# Patient Record
Sex: MALE | Race: BLACK OR AFRICAN AMERICAN | NOT HISPANIC OR LATINO | URBAN - METROPOLITAN AREA
[De-identification: names, ages, dates, MRNs, and addresses within clinical notes are randomized per-mention and may not be internally consistent; named-entity substitution may affect disease eponyms.]

---

## 2017-05-15 ENCOUNTER — EMERGENCY (EMERGENCY)
Facility: HOSPITAL | Age: 62
LOS: 1 days | Discharge: HOME | End: 2017-05-15
Attending: EMERGENCY MEDICINE | Admitting: EMERGENCY MEDICINE
Payer: COMMERCIAL

## 2017-05-15 VITALS
SYSTOLIC BLOOD PRESSURE: 142 MMHG | RESPIRATION RATE: 16 BRPM | DIASTOLIC BLOOD PRESSURE: 89 MMHG | OXYGEN SATURATION: 100 % | TEMPERATURE: 98 F | HEART RATE: 78 BPM

## 2017-05-15 VITALS
DIASTOLIC BLOOD PRESSURE: 89 MMHG | OXYGEN SATURATION: 100 % | HEART RATE: 60 BPM | RESPIRATION RATE: 20 BRPM | TEMPERATURE: 98 F | SYSTOLIC BLOOD PRESSURE: 134 MMHG

## 2017-05-15 LAB
ALBUMIN SERPL ELPH-MCNC: 3.9 G/DL — SIGNIFICANT CHANGE UP (ref 3.3–5)
ALP SERPL-CCNC: 93 U/L — SIGNIFICANT CHANGE UP (ref 40–120)
ALT FLD-CCNC: 15 U/L — SIGNIFICANT CHANGE UP (ref 4–41)
AST SERPL-CCNC: 19 U/L — SIGNIFICANT CHANGE UP (ref 4–40)
BASOPHILS # BLD AUTO: 0.04 K/UL — SIGNIFICANT CHANGE UP (ref 0–0.2)
BASOPHILS NFR BLD AUTO: 0.8 % — SIGNIFICANT CHANGE UP (ref 0–2)
BILIRUB SERPL-MCNC: 0.7 MG/DL — SIGNIFICANT CHANGE UP (ref 0.2–1.2)
BUN SERPL-MCNC: 12 MG/DL — SIGNIFICANT CHANGE UP (ref 7–23)
CALCIUM SERPL-MCNC: 9.2 MG/DL — SIGNIFICANT CHANGE UP (ref 8.4–10.5)
CHLORIDE SERPL-SCNC: 104 MMOL/L — SIGNIFICANT CHANGE UP (ref 98–107)
CK MB BLD-MCNC: 2.87 NG/ML — SIGNIFICANT CHANGE UP (ref 1–6.6)
CK MB BLD-MCNC: 2.97 NG/ML — SIGNIFICANT CHANGE UP (ref 1–6.6)
CK SERPL-CCNC: 185 U/L — SIGNIFICANT CHANGE UP (ref 30–200)
CK SERPL-CCNC: 187 U/L — SIGNIFICANT CHANGE UP (ref 30–200)
CO2 SERPL-SCNC: 24 MMOL/L — SIGNIFICANT CHANGE UP (ref 22–31)
CREAT SERPL-MCNC: 1.01 MG/DL — SIGNIFICANT CHANGE UP (ref 0.5–1.3)
EOSINOPHIL # BLD AUTO: 0.21 K/UL — SIGNIFICANT CHANGE UP (ref 0–0.5)
EOSINOPHIL NFR BLD AUTO: 4 % — SIGNIFICANT CHANGE UP (ref 0–6)
GLUCOSE SERPL-MCNC: 101 MG/DL — HIGH (ref 70–99)
HCT VFR BLD CALC: 37.9 % — LOW (ref 39–50)
HGB BLD-MCNC: 12.4 G/DL — LOW (ref 13–17)
IMM GRANULOCYTES NFR BLD AUTO: 0.2 % — SIGNIFICANT CHANGE UP (ref 0–1.5)
LYMPHOCYTES # BLD AUTO: 1.41 K/UL — SIGNIFICANT CHANGE UP (ref 1–3.3)
LYMPHOCYTES # BLD AUTO: 27 % — SIGNIFICANT CHANGE UP (ref 13–44)
MCHC RBC-ENTMCNC: 29 PG — SIGNIFICANT CHANGE UP (ref 27–34)
MCHC RBC-ENTMCNC: 32.7 % — SIGNIFICANT CHANGE UP (ref 32–36)
MCV RBC AUTO: 88.6 FL — SIGNIFICANT CHANGE UP (ref 80–100)
MONOCYTES # BLD AUTO: 0.47 K/UL — SIGNIFICANT CHANGE UP (ref 0–0.9)
MONOCYTES NFR BLD AUTO: 9 % — SIGNIFICANT CHANGE UP (ref 2–14)
NEUTROPHILS # BLD AUTO: 3.09 K/UL — SIGNIFICANT CHANGE UP (ref 1.8–7.4)
NEUTROPHILS NFR BLD AUTO: 59 % — SIGNIFICANT CHANGE UP (ref 43–77)
PLATELET # BLD AUTO: 233 K/UL — SIGNIFICANT CHANGE UP (ref 150–400)
PMV BLD: 10.4 FL — SIGNIFICANT CHANGE UP (ref 7–13)
POTASSIUM SERPL-MCNC: 4.3 MMOL/L — SIGNIFICANT CHANGE UP (ref 3.5–5.3)
POTASSIUM SERPL-SCNC: 4.3 MMOL/L — SIGNIFICANT CHANGE UP (ref 3.5–5.3)
PROT SERPL-MCNC: 6.5 G/DL — SIGNIFICANT CHANGE UP (ref 6–8.3)
RBC # BLD: 4.28 M/UL — SIGNIFICANT CHANGE UP (ref 4.2–5.8)
RBC # FLD: 13.4 % — SIGNIFICANT CHANGE UP (ref 10.3–14.5)
SODIUM SERPL-SCNC: 142 MMOL/L — SIGNIFICANT CHANGE UP (ref 135–145)
TROPONIN T SERPL-MCNC: < 0.06 NG/ML — SIGNIFICANT CHANGE UP (ref 0–0.06)
TROPONIN T SERPL-MCNC: < 0.06 NG/ML — SIGNIFICANT CHANGE UP (ref 0–0.06)
WBC # BLD: 5.23 K/UL — SIGNIFICANT CHANGE UP (ref 3.8–10.5)
WBC # FLD AUTO: 5.23 K/UL — SIGNIFICANT CHANGE UP (ref 3.8–10.5)

## 2017-05-15 PROCEDURE — 71020: CPT | Mod: 26

## 2017-05-15 PROCEDURE — 93010 ELECTROCARDIOGRAM REPORT: CPT | Mod: 76

## 2017-05-15 PROCEDURE — 99285 EMERGENCY DEPT VISIT HI MDM: CPT | Mod: 25

## 2017-05-15 RX ORDER — KETOROLAC TROMETHAMINE 30 MG/ML
30 SYRINGE (ML) INJECTION ONCE
Refills: 0 | Status: DISCONTINUED | OUTPATIENT
Start: 2017-05-15 | End: 2017-05-15

## 2017-05-15 RX ORDER — ASPIRIN/CALCIUM CARB/MAGNESIUM 324 MG
162 TABLET ORAL ONCE
Refills: 0 | Status: COMPLETED | OUTPATIENT
Start: 2017-05-15 | End: 2017-05-15

## 2017-05-15 RX ADMIN — Medication 162 MILLIGRAM(S): at 06:51

## 2017-05-15 RX ADMIN — Medication 30 MILLIGRAM(S): at 05:30

## 2017-05-15 RX ADMIN — Medication 30 MILLIGRAM(S): at 06:00

## 2017-05-15 NOTE — ED PROVIDER NOTE - OBJECTIVE STATEMENT
60yo male h/o GIST tumor on Gleevec, HTn (norvasc) p/w 3 days right sided chest pain, worsening. Pain is worse with certain movements, radiates to right shoulder when extending his arm. No associated SOB, nausea, diaphoresis, numbness or tingling. 62yo male h/o GIST tumor on Gleevec, HTn (norvasc) p/w 3 days right sided chest pain, worsening. Pain is worse with certain movements, radiates to right shoulder when extending his arm. No associated SOB, nausea, diaphoresis, numbness or tingling. Never had similar pain before. Pt is , lifts about 20lb items at a time. Pain improved after taking motrin. Pt had b/l calf pain 2 weeks and was found to have elevated CPK 2/2 gleevec which was stopped for 2 weeks, CK returned to normal and gleevec was restarted

## 2017-05-15 NOTE — ED ADULT NURSE NOTE - OBJECTIVE STATEMENT
Patient received in room #24 c/o right chest pain radiating to right shoulder starting friday.. Patient A&OX3, ambulatory. Patient reports increase pain when he bends down. Patient denies any headache, N/V/D. Denies any sob. 20G IV Placed in left ac, labs drawn and sent. Will monitor.

## 2017-05-15 NOTE — ED ADULT TRIAGE NOTE - CHIEF COMPLAINT QUOTE
right sided chest pressure since Friday on and off. pt says  now it is more and he feels like he can't breathe. denies nausea or vomiting or any other associated symptoms. PMH OF HTN

## 2017-05-15 NOTE — ED PROVIDER NOTE - MEDICAL DECISION MAKING DETAILS
60yo male htn with right sided chest pain, reproducible, no SOB, non exertional, atypical chest pain, cex2, ekg, xr, pain control, reassess, likely msk

## 2017-05-15 NOTE — ED PROVIDER NOTE - MUSCULOSKELETAL, MLM
Spine appears normal, range of motion is not limited, chest pain reproducible with extension of right arm

## 2018-02-19 ENCOUNTER — OUTPATIENT (OUTPATIENT)
Dept: OUTPATIENT SERVICES | Facility: HOSPITAL | Age: 63
LOS: 1 days | End: 2018-02-19
Payer: COMMERCIAL

## 2018-02-19 PROCEDURE — 73502 X-RAY EXAM HIP UNI 2-3 VIEWS: CPT | Mod: 26,LT

## 2018-02-19 PROCEDURE — 73502 X-RAY EXAM HIP UNI 2-3 VIEWS: CPT

## 2018-02-20 ENCOUNTER — OUTPATIENT (OUTPATIENT)
Dept: OUTPATIENT SERVICES | Facility: HOSPITAL | Age: 63
LOS: 1 days | End: 2018-02-20
Payer: COMMERCIAL

## 2018-02-20 DIAGNOSIS — Z22.321 CARRIER OR SUSPECTED CARRIER OF METHICILLIN SUSCEPTIBLE STAPHYLOCOCCUS AUREUS: ICD-10-CM

## 2018-02-20 LAB
MRSA PCR RESULT.: NEGATIVE — SIGNIFICANT CHANGE UP
S AUREUS DNA NOSE QL NAA+PROBE: POSITIVE

## 2018-02-20 PROCEDURE — 87641 MR-STAPH DNA AMP PROBE: CPT

## 2018-02-21 RX ORDER — MUPIROCIN 20 MG/G
1 OINTMENT TOPICAL
Qty: 1 | Refills: 0
Start: 2018-02-21 | End: 2018-02-25

## 2018-02-27 VITALS
HEART RATE: 59 BPM | HEIGHT: 72 IN | TEMPERATURE: 97 F | OXYGEN SATURATION: 100 % | RESPIRATION RATE: 16 BRPM | DIASTOLIC BLOOD PRESSURE: 84 MMHG | WEIGHT: 175.05 LBS | SYSTOLIC BLOOD PRESSURE: 126 MMHG

## 2018-02-27 NOTE — PATIENT PROFILE ADULT. - PMH
Burning feet syndrome    GIST (gastrointestinal stromal tumor), non-malignant    HTN (hypertension)    Leg pain, left    Osteoarthritis of hip Burning feet syndrome    GIST (gastrointestinal stromal tumor), non-malignant    Glaucoma    HTN (hypertension)    Leg pain, left    Osteoarthritis of hip

## 2018-02-27 NOTE — H&P ADULT - PROBLEM SELECTOR PLAN 1
Admit to Orthopaedic Service.  Presents today for elective left total hip replacement  Pt medically stable and cleared for procedure today by Dr. Venkat Lopez Admit to Orthopaedic Service.  Presents today for elective left total hip replacement  Pt cleared for procedure today by Dr. Venkat Lopez

## 2018-02-27 NOTE — PATIENT PROFILE ADULT. - TEACHING/LEARNING LEARNING PREFERENCES
written material/verbal instruction written material/individual instruction/skill demonstration/verbal instruction

## 2018-02-27 NOTE — H&P ADULT - HISTORY OF PRESENT ILLNESS
62M c/o left hip pain x    Presents today for elective left total hip replacement 62M  c/o left hip pain worsened over time without improvement. Denies numbness, tingling. Ambulates without assist. Presents today for elective Left THR.

## 2018-02-27 NOTE — H&P ADULT - NSHPLABSRESULTS_GEN_ALL_CORE
Preop CBC, CMP, PT/PTT/INR, UA - WNL per medical clearance   Preop EKG - sinus rhythm, incomplete RBBB - reviewed by clearance  Preop CXR - no consolidation  Nasal swab +MSSA Preop CBC, CMP, PT/PTT/INR, UA - WNL per medical clearance   Preop EKG - sinus rhythm, incomplete RBBB - reviewed by clearance  Preop CXR - no consolidation  Nasal swab +MSSA, completed treatment

## 2018-02-27 NOTE — H&P ADULT - NSHPPHYSICALEXAM_GEN_ALL_CORE
Head normal, atraumatic. Skin warm, dry and intact without erythema/lesions/rashes  MSK: +decreased ROM secondary to pain, left hip    Remainder of physical exam per medical clearance note Left LE: Decreased ROM secondary to pain, sensation intact, DP 2+, brisk cap refill, EHL/FHL/TA/GS 5/5  Rest of PE per MD clearance

## 2018-02-28 ENCOUNTER — INPATIENT (INPATIENT)
Facility: HOSPITAL | Age: 63
LOS: 1 days | Discharge: HOME HEALTH | DRG: 470 | End: 2018-03-02
Payer: COMMERCIAL

## 2018-02-28 DIAGNOSIS — M16.12 UNILATERAL PRIMARY OSTEOARTHRITIS, LEFT HIP: ICD-10-CM

## 2018-02-28 DIAGNOSIS — I95.9 HYPOTENSION, UNSPECIFIED: ICD-10-CM

## 2018-02-28 DIAGNOSIS — Z87.891 PERSONAL HISTORY OF NICOTINE DEPENDENCE: ICD-10-CM

## 2018-02-28 DIAGNOSIS — Z79.899 OTHER LONG TERM (CURRENT) DRUG THERAPY: ICD-10-CM

## 2018-02-28 DIAGNOSIS — I10 ESSENTIAL (PRIMARY) HYPERTENSION: ICD-10-CM

## 2018-02-28 DIAGNOSIS — D62 ACUTE POSTHEMORRHAGIC ANEMIA: ICD-10-CM

## 2018-02-28 DIAGNOSIS — M16.9 OSTEOARTHRITIS OF HIP, UNSPECIFIED: ICD-10-CM

## 2018-02-28 DIAGNOSIS — E53.9 VITAMIN B DEFICIENCY, UNSPECIFIED: ICD-10-CM

## 2018-02-28 DIAGNOSIS — Z86.018 PERSONAL HISTORY OF OTHER BENIGN NEOPLASM: ICD-10-CM

## 2018-02-28 DIAGNOSIS — C92.90 MYELOID LEUKEMIA, UNSPECIFIED, NOT HAVING ACHIEVED REMISSION: ICD-10-CM

## 2018-02-28 DIAGNOSIS — E53.8 DEFICIENCY OF OTHER SPECIFIED B GROUP VITAMINS: ICD-10-CM

## 2018-02-28 DIAGNOSIS — H40.10X0 UNSPECIFIED OPEN-ANGLE GLAUCOMA, STAGE UNSPECIFIED: ICD-10-CM

## 2018-02-28 DIAGNOSIS — Z98.890 OTHER SPECIFIED POSTPROCEDURAL STATES: Chronic | ICD-10-CM

## 2018-02-28 DIAGNOSIS — D13.1 BENIGN NEOPLASM OF STOMACH: Chronic | ICD-10-CM

## 2018-02-28 DIAGNOSIS — Z81.8 FAMILY HISTORY OF OTHER MENTAL AND BEHAVIORAL DISORDERS: ICD-10-CM

## 2018-02-28 DIAGNOSIS — Z28.09 IMMUNIZATION NOT CARRIED OUT BECAUSE OF OTHER CONTRAINDICATION: ICD-10-CM

## 2018-02-28 DIAGNOSIS — D21.4 BENIGN NEOPLASM OF CONNECTIVE AND OTHER SOFT TISSUE OF ABDOMEN: ICD-10-CM

## 2018-02-28 LAB
HCT VFR BLD CALC: 34.5 % — LOW (ref 39–50)
HGB BLD-MCNC: 11.3 G/DL — LOW (ref 13–17)
MCHC RBC-ENTMCNC: 29.2 PG — SIGNIFICANT CHANGE UP (ref 27–34)
MCHC RBC-ENTMCNC: 32.8 G/DL — SIGNIFICANT CHANGE UP (ref 32–36)
MCV RBC AUTO: 89.1 FL — SIGNIFICANT CHANGE UP (ref 80–100)
PLATELET # BLD AUTO: 210 K/UL — SIGNIFICANT CHANGE UP (ref 150–400)
RBC # BLD: 3.87 M/UL — LOW (ref 4.2–5.8)
RBC # FLD: 13.3 % — SIGNIFICANT CHANGE UP (ref 10.3–16.9)
WBC # BLD: 5.9 K/UL — SIGNIFICANT CHANGE UP (ref 3.8–10.5)
WBC # FLD AUTO: 5.9 K/UL — SIGNIFICANT CHANGE UP (ref 3.8–10.5)

## 2018-02-28 PROCEDURE — 72170 X-RAY EXAM OF PELVIS: CPT | Mod: 26

## 2018-02-28 RX ORDER — POLYETHYLENE GLYCOL 3350 17 G/17G
17 POWDER, FOR SOLUTION ORAL DAILY
Refills: 0 | Status: DISCONTINUED | OUTPATIENT
Start: 2018-02-28 | End: 2018-03-02

## 2018-02-28 RX ORDER — MORPHINE SULFATE 50 MG/1
4 CAPSULE, EXTENDED RELEASE ORAL ONCE
Refills: 0 | Status: DISCONTINUED | OUTPATIENT
Start: 2018-02-28 | End: 2018-02-28

## 2018-02-28 RX ORDER — ONDANSETRON 8 MG/1
4 TABLET, FILM COATED ORAL EVERY 6 HOURS
Refills: 0 | Status: DISCONTINUED | OUTPATIENT
Start: 2018-02-28 | End: 2018-03-02

## 2018-02-28 RX ORDER — BUPIVACAINE 13.3 MG/ML
20 INJECTION, SUSPENSION, LIPOSOMAL INFILTRATION ONCE
Refills: 0 | Status: DISCONTINUED | OUTPATIENT
Start: 2018-02-28 | End: 2018-03-02

## 2018-02-28 RX ORDER — HETASTARCH 6 G/100ML
500 INJECTION, SOLUTION INTRAVENOUS ONCE
Refills: 0 | Status: DISCONTINUED | OUTPATIENT
Start: 2018-02-28 | End: 2018-03-01

## 2018-02-28 RX ORDER — SODIUM CHLORIDE 9 MG/ML
1000 INJECTION, SOLUTION INTRAVENOUS
Refills: 0 | Status: DISCONTINUED | OUTPATIENT
Start: 2018-02-28 | End: 2018-03-01

## 2018-02-28 RX ORDER — IMATINIB MESYLATE 400 MG/1
400 TABLET, FILM COATED ORAL DAILY
Refills: 0 | Status: DISCONTINUED | OUTPATIENT
Start: 2018-02-28 | End: 2018-03-01

## 2018-02-28 RX ORDER — OXYCODONE HYDROCHLORIDE 5 MG/1
10 TABLET ORAL EVERY 12 HOURS
Refills: 0 | Status: DISCONTINUED | OUTPATIENT
Start: 2018-02-28 | End: 2018-03-01

## 2018-02-28 RX ORDER — CEFAZOLIN SODIUM 1 G
2000 VIAL (EA) INJECTION EVERY 8 HOURS
Refills: 0 | Status: COMPLETED | OUTPATIENT
Start: 2018-02-28 | End: 2018-03-01

## 2018-02-28 RX ORDER — SENNA PLUS 8.6 MG/1
2 TABLET ORAL AT BEDTIME
Refills: 0 | Status: DISCONTINUED | OUTPATIENT
Start: 2018-02-28 | End: 2018-03-02

## 2018-02-28 RX ORDER — ASPIRIN/CALCIUM CARB/MAGNESIUM 324 MG
325 TABLET ORAL
Refills: 0 | Status: DISCONTINUED | OUTPATIENT
Start: 2018-02-28 | End: 2018-03-02

## 2018-02-28 RX ORDER — DOCUSATE SODIUM 100 MG
100 CAPSULE ORAL THREE TIMES A DAY
Refills: 0 | Status: DISCONTINUED | OUTPATIENT
Start: 2018-02-28 | End: 2018-03-02

## 2018-02-28 RX ORDER — LATANOPROST 0.05 MG/ML
1 SOLUTION/ DROPS OPHTHALMIC; TOPICAL AT BEDTIME
Refills: 0 | Status: DISCONTINUED | OUTPATIENT
Start: 2018-02-28 | End: 2018-03-02

## 2018-02-28 RX ORDER — ACETAMINOPHEN 500 MG
650 TABLET ORAL EVERY 6 HOURS
Refills: 0 | Status: DISCONTINUED | OUTPATIENT
Start: 2018-02-28 | End: 2018-03-02

## 2018-02-28 RX ORDER — OXYCODONE HYDROCHLORIDE 5 MG/1
10 TABLET ORAL EVERY 4 HOURS
Refills: 0 | Status: DISCONTINUED | OUTPATIENT
Start: 2018-02-28 | End: 2018-03-01

## 2018-02-28 RX ORDER — AMLODIPINE BESYLATE 2.5 MG/1
5 TABLET ORAL DAILY
Refills: 0 | Status: DISCONTINUED | OUTPATIENT
Start: 2018-02-28 | End: 2018-03-02

## 2018-02-28 RX ADMIN — Medication 100 MILLIGRAM(S): at 15:48

## 2018-02-28 RX ADMIN — Medication 325 MILLIGRAM(S): at 17:31

## 2018-02-28 RX ADMIN — Medication 650 MILLIGRAM(S): at 22:14

## 2018-02-28 RX ADMIN — Medication 650 MILLIGRAM(S): at 23:14

## 2018-02-28 RX ADMIN — Medication 100 MILLIGRAM(S): at 21:15

## 2018-02-28 RX ADMIN — LATANOPROST 1 DROP(S): 0.05 SOLUTION/ DROPS OPHTHALMIC; TOPICAL at 21:20

## 2018-02-28 RX ADMIN — Medication 650 MILLIGRAM(S): at 17:29

## 2018-02-28 RX ADMIN — ONDANSETRON 4 MILLIGRAM(S): 8 TABLET, FILM COATED ORAL at 16:10

## 2018-02-28 RX ADMIN — MORPHINE SULFATE 4 MILLIGRAM(S): 50 CAPSULE, EXTENDED RELEASE ORAL at 12:34

## 2018-02-28 NOTE — PROGRESS NOTE ADULT - SUBJECTIVE AND OBJECTIVE BOX
Ortho Post Op Check    Procedure: Left total hip replacement  Surgeon: Dr. Hoang    Pt resting comfortably without complaints, pain well controlled.  Denies CP, SOB, N/V, numbness/tingling of extremities.    Vital Signs Last 24 Hrs  T(C): 36.4 (02-28-18 @ 09:49), Max: 36.4 (02-28-18 @ 09:49)  T(F): 97.5 (02-28-18 @ 09:49), Max: 97.5 (02-28-18 @ 09:49)  HR: 61 (02-28-18 @ 12:54) (49 - 65)  BP: 93/62 (02-28-18 @ 12:54) (74/50 - 123/82)  BP(mean): --  RR: 14 (02-28-18 @ 12:54) (13 - 18)  SpO2: 98% (02-28-18 @ 10:49) (98% - 100%)  AVSS    General: Pt Alert and oriented, NAD  DSG C/D/I left hip  Pulses: DP pulses 2+, toes wwp, cap refill brisk  Sensation: intact and equal to light touch  Motor: EHL/FHL/TA/GS 5/5 strength bilaterally                          11.3   5.9   )-----------( 210      ( 28 Feb 2018 11:13 )             34.5           Post-op X-Ray: left hip prosthesis intact without fracture or foreign body    A/P: 62yMale POD#0 s/p Left total hip replacement  - Stable  - Pain Control  - DVT ppx: ASA 325mg BID  - Post op abx: Ancef  - PT, WBS: WBAT  - F/u AM labs    Ortho Pager [phone]

## 2018-02-28 NOTE — PHYSICAL THERAPY INITIAL EVALUATION ADULT - GENERAL OBSERVATIONS, REHAB EVAL
Patient received semi-supine no acute distress +adductor pillow +SCDs +IV +CDI dressing, agreeable to PT Eval and cleared for PT by TAJ Briscoe. Patient left as found +call bell, family present, TAJ Hall (covering) present and aware.

## 2018-02-28 NOTE — PHYSICAL THERAPY INITIAL EVALUATION ADULT - CRITERIA FOR SKILLED THERAPEUTIC INTERVENTIONS
rehab potential/therapy frequency/impairments found/anticipated equipment needs at discharge/functional limitations in following categories/predicted duration of therapy intervention/risk reduction/prevention/anticipated discharge recommendation

## 2018-02-28 NOTE — PHYSICAL THERAPY INITIAL EVALUATION ADULT - PERTINENT HX OF CURRENT PROBLEM, REHAB EVAL
62M  c/o left hip pain worsened over time without improvement. Denies numbness, tingling. Ambulates without assist. Presents today for elective Left THR.

## 2018-03-01 LAB
ANION GAP SERPL CALC-SCNC: 11 MMOL/L — SIGNIFICANT CHANGE UP (ref 5–17)
BUN SERPL-MCNC: 19 MG/DL — SIGNIFICANT CHANGE UP (ref 7–23)
CALCIUM SERPL-MCNC: 8.4 MG/DL — SIGNIFICANT CHANGE UP (ref 8.4–10.5)
CHLORIDE SERPL-SCNC: 97 MMOL/L — SIGNIFICANT CHANGE UP (ref 96–108)
CO2 SERPL-SCNC: 24 MMOL/L — SIGNIFICANT CHANGE UP (ref 22–31)
CREAT SERPL-MCNC: 0.97 MG/DL — SIGNIFICANT CHANGE UP (ref 0.5–1.3)
GLUCOSE SERPL-MCNC: 119 MG/DL — HIGH (ref 70–99)
HCT VFR BLD CALC: 24.7 % — LOW (ref 39–50)
HGB BLD-MCNC: 8.4 G/DL — LOW (ref 13–17)
MCHC RBC-ENTMCNC: 29.9 PG — SIGNIFICANT CHANGE UP (ref 27–34)
MCHC RBC-ENTMCNC: 34 G/DL — SIGNIFICANT CHANGE UP (ref 32–36)
MCV RBC AUTO: 87.9 FL — SIGNIFICANT CHANGE UP (ref 80–100)
PLATELET # BLD AUTO: 184 K/UL — SIGNIFICANT CHANGE UP (ref 150–400)
POTASSIUM SERPL-MCNC: 4.1 MMOL/L — SIGNIFICANT CHANGE UP (ref 3.5–5.3)
POTASSIUM SERPL-SCNC: 4.1 MMOL/L — SIGNIFICANT CHANGE UP (ref 3.5–5.3)
RBC # BLD: 2.81 M/UL — LOW (ref 4.2–5.8)
RBC # FLD: 13.2 % — SIGNIFICANT CHANGE UP (ref 10.3–16.9)
SODIUM SERPL-SCNC: 132 MMOL/L — LOW (ref 135–145)
WBC # BLD: 6.3 K/UL — SIGNIFICANT CHANGE UP (ref 3.8–10.5)
WBC # FLD AUTO: 6.3 K/UL — SIGNIFICANT CHANGE UP (ref 3.8–10.5)

## 2018-03-01 RX ORDER — POLYETHYLENE GLYCOL 3350 17 G/17G
17 POWDER, FOR SOLUTION ORAL
Qty: 0 | Refills: 0 | DISCHARGE
Start: 2018-03-01

## 2018-03-01 RX ORDER — SENNA PLUS 8.6 MG/1
2 TABLET ORAL
Qty: 0 | Refills: 0 | DISCHARGE
Start: 2018-03-01

## 2018-03-01 RX ORDER — SODIUM CHLORIDE 9 MG/ML
1000 INJECTION INTRAMUSCULAR; INTRAVENOUS; SUBCUTANEOUS ONCE
Refills: 0 | Status: COMPLETED | OUTPATIENT
Start: 2018-03-01 | End: 2018-03-01

## 2018-03-01 RX ORDER — CELECOXIB 200 MG/1
200 CAPSULE ORAL
Refills: 0 | Status: DISCONTINUED | OUTPATIENT
Start: 2018-03-01 | End: 2018-03-02

## 2018-03-01 RX ORDER — OXYCODONE HYDROCHLORIDE 5 MG/1
5 TABLET ORAL EVERY 4 HOURS
Refills: 0 | Status: DISCONTINUED | OUTPATIENT
Start: 2018-03-01 | End: 2018-03-02

## 2018-03-01 RX ORDER — DOCUSATE SODIUM 100 MG
1 CAPSULE ORAL
Qty: 0 | Refills: 0 | DISCHARGE
Start: 2018-03-01

## 2018-03-01 RX ORDER — KETOROLAC TROMETHAMINE 30 MG/ML
15 SYRINGE (ML) INJECTION EVERY 6 HOURS
Refills: 0 | Status: COMPLETED | OUTPATIENT
Start: 2018-03-01 | End: 2018-03-02

## 2018-03-01 RX ORDER — ACETAMINOPHEN 500 MG
975 TABLET ORAL EVERY 8 HOURS
Refills: 0 | Status: DISCONTINUED | OUTPATIENT
Start: 2018-03-01 | End: 2018-03-02

## 2018-03-01 RX ADMIN — Medication 325 MILLIGRAM(S): at 18:01

## 2018-03-01 RX ADMIN — Medication 15 MILLIGRAM(S): at 12:16

## 2018-03-01 RX ADMIN — Medication 975 MILLIGRAM(S): at 13:16

## 2018-03-01 RX ADMIN — Medication 100 MILLIGRAM(S): at 12:16

## 2018-03-01 RX ADMIN — AMLODIPINE BESYLATE 5 MILLIGRAM(S): 2.5 TABLET ORAL at 06:04

## 2018-03-01 RX ADMIN — Medication 100 MILLIGRAM(S): at 21:04

## 2018-03-01 RX ADMIN — Medication 15 MILLIGRAM(S): at 18:01

## 2018-03-01 RX ADMIN — Medication 975 MILLIGRAM(S): at 21:04

## 2018-03-01 RX ADMIN — SENNA PLUS 2 TABLET(S): 8.6 TABLET ORAL at 21:04

## 2018-03-01 RX ADMIN — CELECOXIB 200 MILLIGRAM(S): 200 CAPSULE ORAL at 18:01

## 2018-03-01 RX ADMIN — OXYCODONE HYDROCHLORIDE 10 MILLIGRAM(S): 5 TABLET ORAL at 06:50

## 2018-03-01 RX ADMIN — POLYETHYLENE GLYCOL 3350 17 GRAM(S): 17 POWDER, FOR SOLUTION ORAL at 12:16

## 2018-03-01 RX ADMIN — Medication 100 MILLIGRAM(S): at 00:37

## 2018-03-01 RX ADMIN — Medication 975 MILLIGRAM(S): at 21:46

## 2018-03-01 RX ADMIN — SODIUM CHLORIDE 1000 MILLILITER(S): 9 INJECTION INTRAMUSCULAR; INTRAVENOUS; SUBCUTANEOUS at 12:17

## 2018-03-01 RX ADMIN — Medication 30 MILLILITER(S): at 10:25

## 2018-03-01 RX ADMIN — Medication 100 MILLIGRAM(S): at 06:04

## 2018-03-01 RX ADMIN — Medication 325 MILLIGRAM(S): at 06:04

## 2018-03-01 RX ADMIN — OXYCODONE HYDROCHLORIDE 10 MILLIGRAM(S): 5 TABLET ORAL at 06:05

## 2018-03-01 RX ADMIN — OXYCODONE HYDROCHLORIDE 10 MILLIGRAM(S): 5 TABLET ORAL at 04:50

## 2018-03-01 RX ADMIN — Medication 975 MILLIGRAM(S): at 12:16

## 2018-03-01 RX ADMIN — OXYCODONE HYDROCHLORIDE 10 MILLIGRAM(S): 5 TABLET ORAL at 03:50

## 2018-03-01 RX ADMIN — Medication 30 MILLILITER(S): at 19:43

## 2018-03-01 RX ADMIN — LATANOPROST 1 DROP(S): 0.05 SOLUTION/ DROPS OPHTHALMIC; TOPICAL at 21:05

## 2018-03-01 NOTE — DISCHARGE NOTE ADULT - MEDICATION SUMMARY - MEDICATIONS TO TAKE
I will START or STAY ON the medications listed below when I get home from the hospital:    meloxicam 15 mg oral tablet  -- 1 tab(s) by mouth once a day   -- Do not take this drug if you are pregnant.  Obtain medical advice before taking any non-prescription drugs as some may affect the action of this medication.  Take with food or milk.    -- Indication: For antiinflammatory (take after breakfast)    oxyCODONE-acetaminophen 5 mg-325 mg oral tablet  -- 1/2 to 1 tab(s) by mouth every 6 hours, as needed, pain MDD:4    -- Caution federal law prohibits the transfer of this drug to any person other  than the person for whom it was prescribed.  May cause drowsiness.  Alcohol may intensify this effect.  Use care when operating dangerous machinery.  This prescription cannot be refilled.  This product contains acetaminophen.  Do not use  with any other product containing acetaminophen to prevent possible liver damage.  Using more of this medication than prescribed may cause serious breathing problems.    -- Indication: For Moderate to severe pain    acetaminophen 325 mg oral tablet  -- 2 tab(s) by mouth every 6 hours, As needed, For Temp over 38.3 C (100.94 F) or mild pain (1-3).  Do not take in the same 4 hours as oxycodone/acetaminophen.  Do not take more than 3,250mg in a day  -- Indication: For Mild pain/fever    Ecotrin 325 mg oral delayed release tablet  -- 1 tab(s) by mouth 2 times a day   -- Swallow whole.  Do not crush.  Take with food or milk.    -- Indication: For Prevent blood clots    imatinib 400 mg oral tablet  -- restart as instructed by your prescribing provider  -- Indication: For Gastrointestinal stomal tumor (restart as instructed by your MD)    amLODIPine 5 mg oral tablet  -- 1 tab(s) by mouth once a day  -- Indication: For Hypertension    bisacodyl 10 mg rectal suppository  -- 1 suppository(ies) rectally once a day, As needed, If no bowel movement by POD#2  -- Indication: For constipation    docusate sodium 100 mg oral capsule  -- 1 cap(s) by mouth 3 times a day  -- Indication: For constipation    polyethylene glycol 3350 oral powder for reconstitution  -- 17 gram(s) by mouth once a day  -- Indication: For constipation    senna oral tablet  -- 2 tab(s) by mouth once a day (at bedtime), As needed, Constipation  -- Indication: For constipation    Xalatan 0.005% ophthalmic solution  -- 1 drop(s) to each affected eye once a day (in the evening)  -- Indication: For Ophthalmic

## 2018-03-01 NOTE — DISCHARGE NOTE ADULT - ADDITIONAL INSTRUCTIONS
Follow up with your primary care provider and specialist Follow up with your primary care provider and oncology specialist

## 2018-03-01 NOTE — DISCHARGE NOTE ADULT - PATIENT PORTAL LINK FT
You can access the [facility], offered by [facility], by registering with the following website: [email or web address]

## 2018-03-01 NOTE — DISCHARGE NOTE ADULT - PLAN OF CARE
Improved ambulation and decreased pain after left total hip replacement Weight bearing as tolerated on left leg with rolling walker  Posterior hip precautions-- keep abductor pillow between legs when laying down  No strenuous activity, heavy lifting, driving, tub bathing, or returning to work until cleared by MD.  You may shower--dressing is waterproof.  Remove dressing after post op day 7, then leave incision open to air.  Follow up with Dr. Hoang to schedule an appt within 10-14 days.  If you don't have a bowel movement by post op day 3, then take Milk of Magnesia (over the counter).  If no bowel movement by at least post op day 5, then use a Dulcolax suppository (over the counter) and/or a Fleets enema--if still no bowel movement, call your MD.  Contact your doctor if you experience: fever greater than 101.5, chills, chest pain, difficulty breathing, bleeding, redness or heat around the incision.

## 2018-03-01 NOTE — PROGRESS NOTE ADULT - SUBJECTIVE AND OBJECTIVE BOX
Ortho Note    Pt comfortable without complaints, pain controlled  Denies CP, SOB, N/V, numbness/tingling     Vital Signs Last 24 Hrs  T(C): 36.1 (03-01-18 @ 08:41), Max: 36.1 (03-01-18 @ 08:41)  T(F): 97 (03-01-18 @ 08:41), Max: 97 (03-01-18 @ 08:41)  HR: 79 (03-01-18 @ 08:41) (71 - 79)  BP: 92/51 (03-01-18 @ 08:41) (92/51 - 106/56)  BP(mean): --  RR: 16 (03-01-18 @ 08:41) (16 - 16)  SpO2: 98% (03-01-18 @ 08:41) (98% - 98%)  AVSS    General: Pt Alert and oriented, NAD  DSG C/D/I  Pulses: 2+ DP/PT   Sensation: SILT s/s/sp/dp      Motor: EHL/FHL/TA/GS                               8.4    6.3   )-----------( 184      ( 01 Mar 2018 07:16 )             24.7   01 Mar 2018 07:12    132    |  97     |  19     ----------------------------<  119    4.1     |  24     |  0.97     Ca    8.4        01 Mar 2018 07:12        A/P:  62yMale POD#1  s/p Left total hip replacement  - Stable  - Pain Control  - DVT ppx: ASA 325mg BID  - Post op abx: Ancef  - PT, WBS: WBAT  - F/u AM labs    Ortho Pager [phone]

## 2018-03-01 NOTE — DISCHARGE NOTE ADULT - CARE PLAN
Principal Discharge DX:	Primary osteoarthritis of left hip  Goal:	Improved ambulation and decreased pain after left total hip replacement  Assessment and plan of treatment:	Weight bearing as tolerated on left leg with rolling walker  Posterior hip precautions-- keep abductor pillow between legs when laying down  No strenuous activity, heavy lifting, driving, tub bathing, or returning to work until cleared by MD.  You may shower--dressing is waterproof.  Remove dressing after post op day 7, then leave incision open to air.  Follow up with Dr. Hoang to schedule an appt within 10-14 days.  If you don't have a bowel movement by post op day 3, then take Milk of Magnesia (over the counter).  If no bowel movement by at least post op day 5, then use a Dulcolax suppository (over the counter) and/or a Fleets enema--if still no bowel movement, call your MD.  Contact your doctor if you experience: fever greater than 101.5, chills, chest pain, difficulty breathing, bleeding, redness or heat around the incision.

## 2018-03-01 NOTE — PROGRESS NOTE ADULT - SUBJECTIVE AND OBJECTIVE BOX
ORTHO NOTE    [x ] Pt seen/examined.  [x ] Pt without any complaints/in NAD.    [ ] Pt complains of:      ROS: [ ] Fever  [ ] Chills  [ ] CP [ ] SOB [ ] Dysnea  [ ] Palpitations [ ] Cough [ ] N/V/C/D [ ] Paresthia [ ] Other     [x] ROS  otherwise negative    .    PHYSICAL EXAM:    Vital Signs Last 24 Hrs  T(C): 36.1 (01 Mar 2018 08:41), Max: 36.6 (28 Feb 2018 21:20)  T(F): 97 (01 Mar 2018 08:41), Max: 97.9 (28 Feb 2018 21:20)  HR: 95 (01 Mar 2018 11:47) (71 - 95)  BP: 98/55 (01 Mar 2018 11:47) (92/51 - 118/74)  BP(mean): --  RR: 16 (01 Mar 2018 08:41) (15 - 17)  SpO2: 99% (01 Mar 2018 11:47) (98% - 100%)    I&O's Detail    01 Mar 2018 07:01  -  01 Mar 2018 15:55  --------------------------------------------------------  IN:  Total IN: 0 mL    OUT:    Voided: 750 mL  Total OUT: 750 mL    Total NET: -750 mL           CAPILLARY BLOOD GLUCOSE                      Neuro: AAOX3    Lungs: CTA, IS demonstrated    CV:    ABD: soft, nontender    Ext: left posterior hip aquacel dsg CDI, L LE nvid motor 5/5, hip abductor pillow intact     LABS                        8.4    6.3   )-----------( 184      ( 01 Mar 2018 07:16 )             24.7                                03-01    132<L>  |  97  |  19  ----------------------------<  119<H>  4.1   |  24  |  0.97    Ca    8.4      01 Mar 2018 07:12        [ ] Other Labs  [ ] None ordered            Please check or [place] when present:  •  Heart Failure:    [ ] Acute        [ ]  Acute on Chronic        [ ] Chronic         [ ] Diastolic     [ ]  Combined    •  UZAIR:     [ ] ATN        [ ]  Renal medullary necrosis       [ ]  Renal cortical necrosis                  [ ] Other pathological Lesion:  •  CKD:  [ ] Stage I   [ ] Stage II  [ ] Stage III    [ ]Stage IV   [ ]  CKD V   [ ]  Other/Unspecified:    •  Abdominal Nutritional Status:   [ ] Malnutrition-See Nutrition note    [ ] Cachexia   [ ]  Other        [ ] Supplement ordered:            [ ] Morbid Obesity: BMI >=40         ASSESSMENT/PLAN:      STATUS POST: L CARMITA (posterior)  H/H 8.4/24.7.  acute blood loss anemia secondary to surgery hbg dropped three points.  Patient was hypotensive in Am and given 1L NS bolus.  Trend cbc  ice sleeve for L hip  history of gastrointestinal stromal tumor.  Patient instructed to hold gleevec for one week post-op    CONTINUE:          [ ] PT- wbat, posterior hip precautions    [ ] DVT PPX- scd    [ ] Pain Mgt- po med and toradol    [ ] Dispo plan- home

## 2018-03-01 NOTE — DISCHARGE NOTE ADULT - HOSPITAL COURSE
Admit 2/28/18  OR 2/28/18 L CARMITA posterior  Periop Antibx  DVT ppx  PT/OT   Pain mgt  med c/s- gastrointestinal stromal tumor

## 2018-03-01 NOTE — DISCHARGE NOTE ADULT - CARE PROVIDER_API CALL
Edward Hoang), Orthopaedic Surgery  [address]  [address] [place] [address]  Phone: [phone]  Fax: [phone]

## 2018-03-01 NOTE — DISCHARGE NOTE ADULT - MEDICATION SUMMARY - MEDICATIONS TO STOP TAKING
I will STOP taking the medications listed below when I get home from the hospital:    mupirocin 2% topical ointment  -- Apply to both nostrils two times a day for 5 days  -- For external use only.

## 2018-03-02 VITALS — DIASTOLIC BLOOD PRESSURE: 67 MMHG | OXYGEN SATURATION: 100 % | SYSTOLIC BLOOD PRESSURE: 112 MMHG | HEART RATE: 91 BPM

## 2018-03-02 LAB
ANION GAP SERPL CALC-SCNC: 9 MMOL/L — SIGNIFICANT CHANGE UP (ref 5–17)
BUN SERPL-MCNC: 17 MG/DL — SIGNIFICANT CHANGE UP (ref 7–23)
CALCIUM SERPL-MCNC: 8.1 MG/DL — LOW (ref 8.4–10.5)
CHLORIDE SERPL-SCNC: 102 MMOL/L — SIGNIFICANT CHANGE UP (ref 96–108)
CO2 SERPL-SCNC: 23 MMOL/L — SIGNIFICANT CHANGE UP (ref 22–31)
CREAT SERPL-MCNC: 1.01 MG/DL — SIGNIFICANT CHANGE UP (ref 0.5–1.3)
GLUCOSE SERPL-MCNC: 107 MG/DL — HIGH (ref 70–99)
HCT VFR BLD CALC: 22.9 % — LOW (ref 39–50)
HGB BLD-MCNC: 7.6 G/DL — LOW (ref 13–17)
MCHC RBC-ENTMCNC: 29.5 PG — SIGNIFICANT CHANGE UP (ref 27–34)
MCHC RBC-ENTMCNC: 33.2 G/DL — SIGNIFICANT CHANGE UP (ref 32–36)
MCV RBC AUTO: 88.8 FL — SIGNIFICANT CHANGE UP (ref 80–100)
PLATELET # BLD AUTO: 161 K/UL — SIGNIFICANT CHANGE UP (ref 150–400)
POTASSIUM SERPL-MCNC: 4.2 MMOL/L — SIGNIFICANT CHANGE UP (ref 3.5–5.3)
POTASSIUM SERPL-SCNC: 4.2 MMOL/L — SIGNIFICANT CHANGE UP (ref 3.5–5.3)
RBC # BLD: 2.58 M/UL — LOW (ref 4.2–5.8)
RBC # FLD: 13.6 % — SIGNIFICANT CHANGE UP (ref 10.3–16.9)
SODIUM SERPL-SCNC: 134 MMOL/L — LOW (ref 135–145)
WBC # BLD: 4.7 K/UL — SIGNIFICANT CHANGE UP (ref 3.8–10.5)
WBC # FLD AUTO: 4.7 K/UL — SIGNIFICANT CHANGE UP (ref 3.8–10.5)

## 2018-03-02 PROCEDURE — 36415 COLL VENOUS BLD VENIPUNCTURE: CPT

## 2018-03-02 PROCEDURE — 86901 BLOOD TYPING SEROLOGIC RH(D): CPT

## 2018-03-02 PROCEDURE — 97116 GAIT TRAINING THERAPY: CPT

## 2018-03-02 PROCEDURE — C1776: CPT

## 2018-03-02 PROCEDURE — 97530 THERAPEUTIC ACTIVITIES: CPT

## 2018-03-02 PROCEDURE — 86850 RBC ANTIBODY SCREEN: CPT

## 2018-03-02 PROCEDURE — C1769: CPT

## 2018-03-02 PROCEDURE — 97161 PT EVAL LOW COMPLEX 20 MIN: CPT

## 2018-03-02 PROCEDURE — 72170 X-RAY EXAM OF PELVIS: CPT

## 2018-03-02 PROCEDURE — 86900 BLOOD TYPING SEROLOGIC ABO: CPT

## 2018-03-02 PROCEDURE — 97110 THERAPEUTIC EXERCISES: CPT

## 2018-03-02 PROCEDURE — 85027 COMPLETE CBC AUTOMATED: CPT

## 2018-03-02 PROCEDURE — 88300 SURGICAL PATH GROSS: CPT

## 2018-03-02 PROCEDURE — 80048 BASIC METABOLIC PNL TOTAL CA: CPT

## 2018-03-02 RX ORDER — ACETAMINOPHEN 500 MG
2 TABLET ORAL
Qty: 0 | Refills: 0 | DISCHARGE
Start: 2018-03-02

## 2018-03-02 RX ORDER — ASPIRIN/CALCIUM CARB/MAGNESIUM 324 MG
1 TABLET ORAL
Qty: 60 | Refills: 0
Start: 2018-03-02 | End: 2018-03-31

## 2018-03-02 RX ORDER — MELOXICAM 15 MG/1
1 TABLET ORAL
Qty: 30 | Refills: 0
Start: 2018-03-02 | End: 2018-03-31

## 2018-03-02 RX ADMIN — Medication 975 MILLIGRAM(S): at 13:21

## 2018-03-02 RX ADMIN — Medication 100 MILLIGRAM(S): at 05:30

## 2018-03-02 RX ADMIN — Medication 15 MILLIGRAM(S): at 13:21

## 2018-03-02 RX ADMIN — Medication 15 MILLIGRAM(S): at 00:00

## 2018-03-02 RX ADMIN — Medication 15 MILLIGRAM(S): at 05:45

## 2018-03-02 RX ADMIN — Medication 15 MILLIGRAM(S): at 00:15

## 2018-03-02 RX ADMIN — CELECOXIB 200 MILLIGRAM(S): 200 CAPSULE ORAL at 07:47

## 2018-03-02 RX ADMIN — Medication 975 MILLIGRAM(S): at 14:00

## 2018-03-02 RX ADMIN — Medication 100 MILLIGRAM(S): at 13:21

## 2018-03-02 RX ADMIN — Medication 15 MILLIGRAM(S): at 13:40

## 2018-03-02 RX ADMIN — Medication 975 MILLIGRAM(S): at 06:00

## 2018-03-02 RX ADMIN — Medication 975 MILLIGRAM(S): at 05:30

## 2018-03-02 RX ADMIN — Medication 325 MILLIGRAM(S): at 05:30

## 2018-03-02 RX ADMIN — Medication 15 MILLIGRAM(S): at 05:30

## 2018-03-02 RX ADMIN — CELECOXIB 200 MILLIGRAM(S): 200 CAPSULE ORAL at 07:15

## 2018-03-02 RX ADMIN — POLYETHYLENE GLYCOL 3350 17 GRAM(S): 17 POWDER, FOR SOLUTION ORAL at 13:21

## 2018-03-02 NOTE — PROGRESS NOTE ADULT - SUBJECTIVE AND OBJECTIVE BOX
Ortho Note    Pt comfortable without complaints, pain controlled  Denies CP, SOB, N/V, numbness/tingling     Vital Signs Last 24 Hrs  T(C): 36.9 (02 Mar 2018 05:24), Max: 37.1 (01 Mar 2018 20:15)  T(F): 98.4 (02 Mar 2018 05:24), Max: 98.8 (01 Mar 2018 20:15)  HR: 81 (02 Mar 2018 05:24) (75 - 95)  BP: 103/66 (02 Mar 2018 05:24) (92/51 - 127/60)  BP(mean): --  RR: 15 (02 Mar 2018 05:24) (15 - 16)  SpO2: 99% (02 Mar 2018 05:24) (98% - 100%)    General: Pt Alert and oriented, NAD  DSG C/D/I  Pulses: 2+ DP/PT   Sensation: SILT s/s/sp/dp      Motor: EHL/FHL/TA/GS                               8.4    6.3   )-----------( 184      ( 01 Mar 2018 07:16 )             24.7   01 Mar 2018 07:12    132    |  97     |  19     ----------------------------<  119    4.1     |  24     |  0.97     Ca    8.4        01 Mar 2018 07:12        A/P:  62yMale POD#2  s/p Left total hip replacement  - Stable  - Pain Control  - DVT ppx: ASA 325mg BID  - Post op abx: Ancef  - PT, WBS: WBAT  - F/u AM labs    Ortho Pager [phone]

## 2018-03-05 LAB — SURGICAL PATHOLOGY STUDY: SIGNIFICANT CHANGE UP

## 2019-07-05 NOTE — ED PROVIDER NOTE - ATTENDING CONTRIBUTION TO CARE
no I, Teresita Castaneda M.D. have examined the patient and confirmed the essential components of the history, physical examination, diagnosis, and treatment plan. I agree with the patient's care as documented by the resident and amended herein by me. See note above for complete details of service.  60 yo M Hx GIST tumor on Gleevac (recently held for rhabdomyolysis), HTN on Norvasc who pw R shoulder pain/chest pain worse with movements of RUE. No dyspnea, diaphoresis, nausea or other complaints reported. Exam reveal reproducible ttp of RUE and on ROM. No overlying skin changes/rashes. Distal NVI. RRR, lungs clear. EKG s/ acute ischemia. Plan labs inc. 4hr trop. CXR. Pain control. Reassess.

## 2019-10-01 ENCOUNTER — EMERGENCY (EMERGENCY)
Facility: HOSPITAL | Age: 64
LOS: 1 days | Discharge: LEFT BEFORE DISCHARGE | End: 2019-10-01
Admitting: EMERGENCY MEDICINE
Payer: COMMERCIAL

## 2019-10-01 VITALS
OXYGEN SATURATION: 100 % | TEMPERATURE: 98 F | HEART RATE: 79 BPM | DIASTOLIC BLOOD PRESSURE: 74 MMHG | SYSTOLIC BLOOD PRESSURE: 121 MMHG | RESPIRATION RATE: 18 BRPM

## 2019-10-01 VITALS
DIASTOLIC BLOOD PRESSURE: 90 MMHG | HEART RATE: 65 BPM | TEMPERATURE: 98 F | RESPIRATION RATE: 19 BRPM | OXYGEN SATURATION: 100 % | SYSTOLIC BLOOD PRESSURE: 155 MMHG

## 2019-10-01 DIAGNOSIS — Z98.890 OTHER SPECIFIED POSTPROCEDURAL STATES: Chronic | ICD-10-CM

## 2019-10-01 DIAGNOSIS — D13.1 BENIGN NEOPLASM OF STOMACH: Chronic | ICD-10-CM

## 2019-10-01 PROCEDURE — 99283 EMERGENCY DEPT VISIT LOW MDM: CPT

## 2019-10-01 RX ORDER — IBUPROFEN 200 MG
600 TABLET ORAL ONCE
Refills: 0 | Status: COMPLETED | OUTPATIENT
Start: 2019-10-01 | End: 2019-10-01

## 2019-10-01 RX ADMIN — Medication 600 MILLIGRAM(S): at 21:32

## 2019-10-01 NOTE — ED PROVIDER NOTE - NSFOLLOWUPINSTRUCTIONS_ED_ALL_ED_FT
Follow up w/ Dr. Usman Hopkins at [facility] on Thursday. Call [phone] for appointment.    Take Motrin 600mg every 8 hours as needed for pain, take with food  Return to the emergency department with any worsening or concerning symptoms, swelling, numbness/tingling, inability to bear weight or any other concerns.

## 2019-10-01 NOTE — ED PROVIDER NOTE - OBJECTIVE STATEMENT
65 y/o male presents to the ED with c/o Lt ankle injury. Pt states was playing table tennis took a step back and felt a pop followed by pain and inability to weight bear. Pt denies any falls to ground, HA, dizziness, numbness, or paresthesia of the extremities. Of note no other injury present. 65 y/o male presents to the ED with c/o Lt ankle injury. Pt states was playing table tennis, took a step back and felt a pop followed by pain and inability to weight bear. Pt denies any falls to ground, HA, dizziness, numbness, or paresthesia of the extremities. No other injury present.

## 2019-10-01 NOTE — CONSULT NOTE ADULT - SUBJECTIVE AND OBJECTIVE BOX
Podiatry pager #: [phone] ([place])/ [phone] ([facility])    Patient is a 64y old  Male who presents with a chief complaint of L heel pain.    HPI: 63 y/o male presents to the ED with c/o Lt ankle injury. Pt states was playing table tennis took a step back and felt a pop followed by pain and inability to weight bear. Pt denies any falls to ground, HA, dizziness, numbness, or paresthesia of the extremities. Of note no other injury present.      PAST MEDICAL & SURGICAL HISTORY:  Glaucoma  HTN (hypertension)  Osteoarthritis of hip  Burning feet syndrome  Leg pain, left  GIST (gastrointestinal stromal tumor), non-malignant  Stomach tumor (benign): REMOVED  H/O hernia repair      MEDICATIONS  (STANDING):    MEDICATIONS  (PRN):      Allergies    No Known Allergies    Intolerances        VITALS:    Vital Signs Last 24 Hrs  T(C): 36.7 (01 Oct 2019 21:31), Max: 36.7 (01 Oct 2019 21:31)  T(F): 98 (01 Oct 2019 21:31), Max: 98 (01 Oct 2019 21:31)  HR: 65 (01 Oct 2019 21:31) (65 - 79)  BP: 155/90 (01 Oct 2019 21:31) (121/74 - 155/90)  BP(mean): --  RR: 19 (01 Oct 2019 21:31) (18 - 19)  SpO2: 100% (01 Oct 2019 21:31) (100% - 100%)    LABS:                CAPILLARY BLOOD GLUCOSE              LOWER EXTREMITY PHYSICAL EXAM:    Vascular: DP/PT 2/4 B/L, CFT <3 seconds B/L, Temperature gradient warm to cool B/L   Neuro: Epicritic sensation intact to the level of toes B/L   Musculoskeletal/Ortho: 4/5 PF muscle strength on left as compared to right, + Ricketts test on left   Skin: delve noted to L achilles, no open wounds, mild edema on dorsum of L foot

## 2019-10-01 NOTE — ED PROVIDER NOTE - PMH
Burning feet syndrome    GIST (gastrointestinal stromal tumor), non-malignant    Glaucoma    HTN (hypertension)    Leg pain, left    Osteoarthritis of hip

## 2019-10-01 NOTE — ED PROVIDER NOTE - CLINICAL SUMMARY MEDICAL DECISION MAKING FREE TEXT BOX
63 y/o male here with likely Achilles rupture. Plan for posterior splint, crutches, and podiatry follow-up.

## 2019-10-01 NOTE — ED ADULT NURSE NOTE - OBJECTIVE STATEMENT
Pt received in intake room 5, AA&OX4. Pt states he was playing tennis and while he was running backwards he felt a snap to his left ankle. Pt states sharp pain in left heel and left ankle and can not bear weight on left leg. Pt denies any fall. Edema noted to left achilles. Pain medication given as per MD order. Will continue to monitor.

## 2019-10-01 NOTE — ED PROVIDER NOTE - PATIENT PORTAL LINK FT
You can access the FollowMyHealth Patient Portal offered by [facility] by registering at the following website: [email or web address]. By joining [email or web address]’s FollowMyHealth portal, you will also be able to view your health information using other applications (apps) compatible with our system.

## 2019-10-01 NOTE — ED ADULT NURSE NOTE - CHIEF COMPLAINT QUOTE
Pt was playing tennis and landed on left foot and heard a snap.  Unable to bear weight.  Left achilles area edematous.

## 2019-10-01 NOTE — CONSULT NOTE ADULT - ASSESSMENT
65 y/o M w/ L posterior heel pain, likely Achilles rupture  -Pt seen and evaluated in ED  -Positive pack test w/ delve noted to Achilles and decreased plantarflexory strength on the left, indicative of Achilles rupture (partial vs full thickness tear)  -Applied plantarflexory gravity splint to LLE  -Instructed pt to be non-WB to LLE in splint using crutches  -Follow up w/ Dr. Usman Hopkins at [facility] on Thursday. Call [phone] for appointment.  -Will obtain MRI outpatient  -Discussed w/ attending

## 2019-10-07 ENCOUNTER — TRANSCRIPTION ENCOUNTER (OUTPATIENT)
Age: 64
End: 2019-10-07

## 2019-10-08 ENCOUNTER — OUTPATIENT (OUTPATIENT)
Dept: OUTPATIENT SERVICES | Facility: HOSPITAL | Age: 64
LOS: 1 days | Discharge: HOME | End: 2019-10-08
Payer: COMMERCIAL

## 2019-10-08 VITALS
SYSTOLIC BLOOD PRESSURE: 126 MMHG | RESPIRATION RATE: 16 BRPM | HEART RATE: 56 BPM | TEMPERATURE: 97 F | WEIGHT: 179.9 LBS | HEIGHT: 72 IN | DIASTOLIC BLOOD PRESSURE: 80 MMHG | OXYGEN SATURATION: 99 %

## 2019-10-08 VITALS
TEMPERATURE: 98 F | SYSTOLIC BLOOD PRESSURE: 135 MMHG | DIASTOLIC BLOOD PRESSURE: 86 MMHG | RESPIRATION RATE: 15 BRPM | HEART RATE: 66 BPM | OXYGEN SATURATION: 100 %

## 2019-10-08 DIAGNOSIS — D13.1 BENIGN NEOPLASM OF STOMACH: Chronic | ICD-10-CM

## 2019-10-08 DIAGNOSIS — S86.001A UNSPECIFIED INJURY OF RIGHT ACHILLES TENDON, INITIAL ENCOUNTER: ICD-10-CM

## 2019-10-08 DIAGNOSIS — S86.019A STRAIN OF UNSPECIFIED ACHILLES TENDON, INITIAL ENCOUNTER: ICD-10-CM

## 2019-10-08 DIAGNOSIS — Z98.890 OTHER SPECIFIED POSTPROCEDURAL STATES: Chronic | ICD-10-CM

## 2019-10-08 LAB
ALBUMIN SERPL ELPH-MCNC: 4.6 G/DL — SIGNIFICANT CHANGE UP (ref 3.3–5)
ALP SERPL-CCNC: 115 U/L — SIGNIFICANT CHANGE UP (ref 40–120)
ALT FLD-CCNC: 17 U/L — SIGNIFICANT CHANGE UP (ref 4–41)
ANION GAP SERPL CALC-SCNC: 10 MMO/L — SIGNIFICANT CHANGE UP (ref 7–14)
ANION GAP SERPL CALC-SCNC: 10 MMO/L — SIGNIFICANT CHANGE UP (ref 7–14)
AST SERPL-CCNC: 18 U/L — SIGNIFICANT CHANGE UP (ref 4–40)
BASOPHILS # BLD AUTO: 0.04 K/UL — SIGNIFICANT CHANGE UP (ref 0–0.2)
BASOPHILS NFR BLD AUTO: 1 % — SIGNIFICANT CHANGE UP (ref 0–2)
BILIRUB SERPL-MCNC: 0.8 MG/DL — SIGNIFICANT CHANGE UP (ref 0.2–1.2)
BUN SERPL-MCNC: 12 MG/DL — SIGNIFICANT CHANGE UP (ref 7–23)
BUN SERPL-MCNC: 12 MG/DL — SIGNIFICANT CHANGE UP (ref 7–23)
CALCIUM SERPL-MCNC: 9.8 MG/DL — SIGNIFICANT CHANGE UP (ref 8.4–10.5)
CALCIUM SERPL-MCNC: 9.8 MG/DL — SIGNIFICANT CHANGE UP (ref 8.4–10.5)
CHLORIDE SERPL-SCNC: 104 MMOL/L — SIGNIFICANT CHANGE UP (ref 98–107)
CHLORIDE SERPL-SCNC: 104 MMOL/L — SIGNIFICANT CHANGE UP (ref 98–107)
CO2 SERPL-SCNC: 28 MMOL/L — SIGNIFICANT CHANGE UP (ref 22–31)
CO2 SERPL-SCNC: 28 MMOL/L — SIGNIFICANT CHANGE UP (ref 22–31)
CREAT SERPL-MCNC: 1.15 MG/DL — SIGNIFICANT CHANGE UP (ref 0.5–1.3)
CREAT SERPL-MCNC: 1.15 MG/DL — SIGNIFICANT CHANGE UP (ref 0.5–1.3)
EOSINOPHIL # BLD AUTO: 0.16 K/UL — SIGNIFICANT CHANGE UP (ref 0–0.5)
EOSINOPHIL NFR BLD AUTO: 4.2 % — SIGNIFICANT CHANGE UP (ref 0–6)
GLUCOSE SERPL-MCNC: 99 MG/DL — SIGNIFICANT CHANGE UP (ref 70–99)
GLUCOSE SERPL-MCNC: 99 MG/DL — SIGNIFICANT CHANGE UP (ref 70–99)
HCT VFR BLD CALC: 39 % — SIGNIFICANT CHANGE UP (ref 39–50)
HCT VFR BLD CALC: 39 % — SIGNIFICANT CHANGE UP (ref 39–50)
HGB BLD-MCNC: 12.6 G/DL — LOW (ref 13–17)
HGB BLD-MCNC: 12.6 G/DL — LOW (ref 13–17)
IMM GRANULOCYTES NFR BLD AUTO: 0 % — SIGNIFICANT CHANGE UP (ref 0–1.5)
LYMPHOCYTES # BLD AUTO: 1.21 K/UL — SIGNIFICANT CHANGE UP (ref 1–3.3)
LYMPHOCYTES # BLD AUTO: 31.5 % — SIGNIFICANT CHANGE UP (ref 13–44)
MCHC RBC-ENTMCNC: 28.4 PG — SIGNIFICANT CHANGE UP (ref 27–34)
MCHC RBC-ENTMCNC: 28.4 PG — SIGNIFICANT CHANGE UP (ref 27–34)
MCHC RBC-ENTMCNC: 32.3 % — SIGNIFICANT CHANGE UP (ref 32–36)
MCHC RBC-ENTMCNC: 32.3 % — SIGNIFICANT CHANGE UP (ref 32–36)
MCV RBC AUTO: 88 FL — SIGNIFICANT CHANGE UP (ref 80–100)
MCV RBC AUTO: 88 FL — SIGNIFICANT CHANGE UP (ref 80–100)
MONOCYTES # BLD AUTO: 0.43 K/UL — SIGNIFICANT CHANGE UP (ref 0–0.9)
MONOCYTES NFR BLD AUTO: 11.2 % — SIGNIFICANT CHANGE UP (ref 2–14)
NEUTROPHILS # BLD AUTO: 2 K/UL — SIGNIFICANT CHANGE UP (ref 1.8–7.4)
NEUTROPHILS NFR BLD AUTO: 52.1 % — SIGNIFICANT CHANGE UP (ref 43–77)
NRBC # FLD: 0 K/UL — SIGNIFICANT CHANGE UP (ref 0–0)
NRBC # FLD: 0 K/UL — SIGNIFICANT CHANGE UP (ref 0–0)
PLATELET # BLD AUTO: 245 K/UL — SIGNIFICANT CHANGE UP (ref 150–400)
PLATELET # BLD AUTO: 245 K/UL — SIGNIFICANT CHANGE UP (ref 150–400)
PMV BLD: 10.9 FL — SIGNIFICANT CHANGE UP (ref 7–13)
PMV BLD: 10.9 FL — SIGNIFICANT CHANGE UP (ref 7–13)
POTASSIUM SERPL-MCNC: 4.5 MMOL/L — SIGNIFICANT CHANGE UP (ref 3.5–5.3)
POTASSIUM SERPL-MCNC: 4.5 MMOL/L — SIGNIFICANT CHANGE UP (ref 3.5–5.3)
POTASSIUM SERPL-SCNC: 4.5 MMOL/L — SIGNIFICANT CHANGE UP (ref 3.5–5.3)
POTASSIUM SERPL-SCNC: 4.5 MMOL/L — SIGNIFICANT CHANGE UP (ref 3.5–5.3)
PROT SERPL-MCNC: 7.5 G/DL — SIGNIFICANT CHANGE UP (ref 6–8.3)
RBC # BLD: 4.43 M/UL — SIGNIFICANT CHANGE UP (ref 4.2–5.8)
RBC # BLD: 4.43 M/UL — SIGNIFICANT CHANGE UP (ref 4.2–5.8)
RBC # FLD: 14 % — SIGNIFICANT CHANGE UP (ref 10.3–14.5)
RBC # FLD: 14 % — SIGNIFICANT CHANGE UP (ref 10.3–14.5)
SODIUM SERPL-SCNC: 142 MMOL/L — SIGNIFICANT CHANGE UP (ref 135–145)
SODIUM SERPL-SCNC: 142 MMOL/L — SIGNIFICANT CHANGE UP (ref 135–145)
WBC # BLD: 3.84 K/UL — SIGNIFICANT CHANGE UP (ref 3.8–10.5)
WBC # BLD: 3.84 K/UL — SIGNIFICANT CHANGE UP (ref 3.8–10.5)
WBC # FLD AUTO: 3.84 K/UL — SIGNIFICANT CHANGE UP (ref 3.8–10.5)
WBC # FLD AUTO: 3.84 K/UL — SIGNIFICANT CHANGE UP (ref 3.8–10.5)

## 2019-10-08 PROCEDURE — 93010 ELECTROCARDIOGRAM REPORT: CPT

## 2019-10-08 RX ORDER — IMATINIB MESYLATE 400 MG/1
0 TABLET, FILM COATED ORAL
Qty: 0 | Refills: 0 | DISCHARGE

## 2019-10-08 RX ORDER — LATANOPROST 0.05 MG/ML
1 SOLUTION/ DROPS OPHTHALMIC; TOPICAL
Qty: 0 | Refills: 0 | DISCHARGE

## 2019-10-08 RX ORDER — AMLODIPINE BESYLATE 2.5 MG/1
1 TABLET ORAL
Qty: 0 | Refills: 0 | DISCHARGE

## 2019-10-08 NOTE — ASU DISCHARGE PLAN (ADULT/PEDIATRIC) - CALL YOUR DOCTOR IF YOU HAVE ANY OF THE FOLLOWING:
Wound/Surgical Site with redness, or foul smelling discharge or pus/Pain not relieved by Medications/Nausea and vomiting that does not stop/Swelling that gets worse/Fever greater than (need to indicate Fahrenheit or Celsius)/Bleeding that does not stop/Numbness, tingling, color or temperature change to extremity

## 2019-10-08 NOTE — H&P PST ADULT - NSICDXPASTSURGICALHX_GEN_ALL_CORE_FT
PAST SURGICAL HISTORY:  H/O hernia repair     Hernia, Inguinal (ICD9 550.90) x 2 Right / left    Stomach tumor (benign) REMOVED

## 2019-10-08 NOTE — H&P PST ADULT - ACTIVITY
Pt walks 4 days week 40 minutes; pt  can walk up to 2.5 hours ; pt is a  ; climbs 5 flights stairs daily Pt walks 4 days week 40 minutes; pt  can walk up to 2.5 hours ; pt is a  ; climbs 5 flights stairs daily; prior to injury

## 2019-10-08 NOTE — H&P PST ADULT - NSANTHOSAYNRD_GEN_A_CORE
No. BHAKTI screening performed.  STOP BANG Legend: 0-2 = LOW Risk; 3-4 = INTERMEDIATE Risk; 5-8 = HIGH Risk

## 2019-10-08 NOTE — PROVIDER CONTACT NOTE (OTHER) - SITUATION
patient status post surgery with elevated diastolic BP. patient did not take HTN medication this morning.

## 2019-10-08 NOTE — ASU DISCHARGE PLAN (ADULT/PEDIATRIC) - ASU DC SPECIAL INSTRUCTIONSFT
Non weight bearing to left foot, crutches  Follow up with Dr. Hood as scheduled Non weight bearing to left foot. use crutches.  Follow up with Dr. Hood as scheduled.

## 2019-10-08 NOTE — ASU DISCHARGE PLAN (ADULT/PEDIATRIC) - PAIN MANAGEMENT
Prescription given to patient/guardian you may take percocet or tylenol at 10:30 PM (10/8/19)/Prescription given to patient/guardian

## 2019-10-08 NOTE — H&P PST ADULT - NSICDXPROBLEM_GEN_ALL_CORE_FT
PROBLEM DIAGNOSES  Problem: Injury of right Achilles tendon  Assessment and Plan: Repair of Achilles tendon Rupture Right Foot  PST DOS  Call to PCP DR Lopez for EKG Comparison, for ST/ECHO 2018 ; message left awaiting fax  To review with Dr Carrasco   HT/Wt as per pt [ recent physical exam] pt non weight bearing PROBLEM DIAGNOSES  Problem: Injury of right Achilles tendon  Assessment and Plan: Repair of Achilles tendon Rupture Right Foot  PST DOS  Call to PCP DR Lopez for EKG Comparison, for ST/ECHO 2018 ; message left awaiting fax  To review with Dr Carrasco   HT/Wt as per pt [ recent physical exam] pt non weight bearing   BHAKTI Precautions ; to make pre op aware PROBLEM DIAGNOSES  Problem: Injury of right Achilles tendon  Assessment and Plan: Repair of Achilles tendon Rupture Right Foot  PST DOS  Call to PCP DR Lopez for EKG Comparison, for ST/ECHO 2018 ; message left awaiting fax  To review with anesthesia pre op   HT/Wt as per pt [ recent physical exam] pt non weight bearing   BHAKTI Precautions ; to make pre op aware PROBLEM DIAGNOSES  Problem: Achilles tendon tear  Assessment and Plan: Repair of Achilles Tendon Left Foot  PST DOS  EKG comparison requested PCP  BHAKTI precautions ; to notify pre op  Anesthesia to review pre op; labs pending EKG comparison pending

## 2019-10-08 NOTE — H&P PST ADULT - NSICDXPASTMEDICALHX_GEN_ALL_CORE_FT
PAST MEDICAL HISTORY:  Bulging disc lumbar spine 2013    Burning feet syndrome 10/08/19 pt denies    GIST (gastrointestinal stromal tumor), non-malignant     Glaucoma     Glaucoma (ICD9 365.9) bilateral    HTN (hypertension) 7  years    HTN (hypertension)     Leg pain, left 10/08/19 ; pt denies    Osteoarthritis of hip PAST MEDICAL HISTORY:  Bulging disc lumbar spine 2013    Burning feet syndrome 10/08/19 pt denies    GIST (gastrointestinal stromal tumor), non-malignant     Glaucoma     Glaucoma (ICD9 365.9) bilateral    HTN (hypertension)     Leg pain, left 10/08/19 ; pt denies    Osteoarthritis of hip

## 2019-10-08 NOTE — H&P PST ADULT - MUSCULOSKELETAL COMMENTS
left foot in LE  Cast ; toes warm ; pt reports positive sensation; positive mobility noted ; positive capillary refill noted

## 2019-10-08 NOTE — H&P PST ADULT - HISTORY OF PRESENT ILLNESS
Pt is a 64 y.o. male ; pt states 1 week ago while playing table tennis felt " pop " left foot . Pt to ER ; evaluation ; pt f/u MRI 10/04/19 ; pt reports " rupture Achilles tendon ". pt now presents for surgery Pt is a 64 y.o. male ; pt states 1 week ago while playing table tennis felt " pop " left foot . Pt to ER ; evaluation ; pt f/u MRI 10/04/19 ; pt reports " rupture Achilles tendon ". pt now presents for Repair of Achilles Tendon rupture Left Foot

## 2019-10-09 ENCOUNTER — EMERGENCY (EMERGENCY)
Facility: HOSPITAL | Age: 64
LOS: 1 days | Discharge: HOME | End: 2019-10-09
Attending: EMERGENCY MEDICINE | Admitting: EMERGENCY MEDICINE
Payer: COMMERCIAL

## 2019-10-09 VITALS
DIASTOLIC BLOOD PRESSURE: 76 MMHG | HEART RATE: 73 BPM | TEMPERATURE: 98 F | RESPIRATION RATE: 16 BRPM | OXYGEN SATURATION: 100 % | SYSTOLIC BLOOD PRESSURE: 135 MMHG

## 2019-10-09 DIAGNOSIS — D13.1 BENIGN NEOPLASM OF STOMACH: Chronic | ICD-10-CM

## 2019-10-09 DIAGNOSIS — Z98.890 OTHER SPECIFIED POSTPROCEDURAL STATES: Chronic | ICD-10-CM

## 2019-10-09 PROCEDURE — 99282 EMERGENCY DEPT VISIT SF MDM: CPT

## 2019-10-09 NOTE — CONSULT NOTE ADULT - SUBJECTIVE AND OBJECTIVE BOX
Patient is a 64y old  Male who presents with a chief complaint of tight cast    HPI: 65yo M with pmhx htn, recent achilles tendon rupture s/p surgical repair and circumfrential casting yesterday with podiatry, presenting to ER today for pain and tightness sensation in achilles and posterior leg. No tingling sensation or other complaints      PAST MEDICAL & SURGICAL HISTORY:  Glaucoma  HTN (hypertension)  Osteoarthritis of hip  Burning feet syndrome: 10/08/19 pt denies  Leg pain, left: 10/08/19 ; pt denies  GIST (gastrointestinal stromal tumor), non-malignant  Bulging disc: lumbar spine 2013  Glaucoma (ICD9 365.9): bilateral  Stomach tumor (benign): REMOVED  H/O hernia repair  Hernia, Inguinal (ICD9 550.90): x 2 Right / left      MEDICATIONS  (STANDING):    MEDICATIONS  (PRN):      Allergies    No Known Allergies    Intolerances        VITALS:    Vital Signs Last 24 Hrs  T(C): 36.8 (09 Oct 2019 17:46), Max: 36.8 (09 Oct 2019 17:46)  T(F): 98.2 (09 Oct 2019 17:46), Max: 98.2 (09 Oct 2019 17:46)  HR: 73 (09 Oct 2019 17:46) (73 - 73)  BP: 135/76 (09 Oct 2019 17:46) (135/76 - 135/76)  BP(mean): --  RR: 16 (09 Oct 2019 17:46) (16 - 16)  SpO2: 100% (09 Oct 2019 17:46) (100% - 100%)    LABS:                          12.6   3.84  )-----------( 245      ( 08 Oct 2019 12:35 )             39.0       10-08    142  |  104  |  12  ----------------------------<  99  4.5   |  28  |  1.15    Ca    9.8      08 Oct 2019 12:35    TPro  7.5  /  Alb  4.6  /  TBili  0.8  /  DBili  x   /  AST  18  /  ALT  17  /  AlkPhos  115  10-08      CAPILLARY BLOOD GLUCOSE              LOWER EXTREMITY PHYSICAL EXAM:    Vascular: DP/PT 2/4, B/L, CFT <3 seconds B/L, Temperature gradient warm to cool B/L.   Neuro: Epicritic sensation grossly present  Musculoskeletal/Ortho: s/p 1 day primary achilles tendon repair    RADIOLOGY & ADDITIONAL STUDIES:

## 2019-10-09 NOTE — ED PROVIDER NOTE - NS ED ROS FT
Constitutional: No weakness or fatigue, no fevers or chills  Skin: No rash or pruritis  HEENT: No sore throat  Cardiovascular: No chest pain, no cough, no shortness of breath  Respiratory: No shortness of breath, no cough  Gastrointestinal: No abdominal pain, no nausea, no vomiting, no diarrhea, no constipation  Musculoskeletal: No joint pain, + pain in left leg  Genitourinary: No dysuria   Neurological: No weakness

## 2019-10-09 NOTE — CONSULT NOTE ADULT - ASSESSMENT
63yo M pt w/ PMHx of HTN w/ c/c of left foot cast tightness  - Pt seen and evaluated  - Vitals stable, no leukocytosis  - s/p left foot primary achilles tendon repair in bi-valve fibular glass cast, pt complains of tightness associated along the distal anterior aspect of leg   - Removed cast and patient demonstrated verbal relief  - Reapplied bi-valve cast utilizing webril, stockinette, & 4" fiberglass cast  - Pt states that cast is to his comfort and understands to f/u with  outpatient podiatrist  - Pt may f/u as outpt for continued care   - d/w attending  - Please reconsult as necessary

## 2019-10-09 NOTE — ED PROVIDER NOTE - PATIENT PORTAL LINK FT
You can access the FollowMyHealth Patient Portal offered by [facility] by registering at the following website: [email or web address]. By joining [facility]’s FollowMyHealth portal, you will also be able to view your health information using other applications (apps) compatible with our system.

## 2019-10-09 NOTE — ED PROVIDER NOTE - NSFOLLOWUPINSTRUCTIONS_ED_ALL_ED_FT
You are discharged to go home  Please return to the Emergency Room if your symptoms change or worsen    Please follow up with you Podiatrist Dr. Nunez for your routine post-operative appointment

## 2019-10-09 NOTE — ED PROVIDER NOTE - PHYSICAL EXAMINATION
General: Patient in no apparent distress, AAO x 3  Skin: Dry and intact  HEENT: Oral mucosa moist.   Eyes: Conjunctiva normal  Cardiac: Regular rate and rhythm  Respiratory: Lungs clear b/l and symmetric. No respiratory distress  Gastrointestinal: Abdomen soft, nondistended, nontender  Musculoskeletal: Moves all extremities spontaneously. LLE distally neurovascular intact, 2+ DP pulse, no edema, soft compartments, warm  Neurological: alert and oriented to personal, place and time  Psychiatric: Cooperative

## 2019-10-09 NOTE — ED PROVIDER NOTE - CLINICAL SUMMARY MEDICAL DECISION MAKING FREE TEXT BOX
63yo M with pmhx htn, recent achilles tendon rupture s/p surgical repair and circumfrential casting yesterday with podiatry, presenting to ER today for pain and tightness sensation in achilles and posterior leg. No tingling sensation or other complaints.  no signs of compartment syndrome on exam.  Podiatry at bedside prior to my eval of patient and removed circumfrential cast after which pt's symptoms completely resolved. New cast that was more loose placed and tolerated by patient. pt has appointment on 10/15 with podiatrist for f/u

## 2019-10-09 NOTE — ED PROVIDER NOTE - PMH
Bulging disc  lumbar spine 2013  Burning feet syndrome  10/08/19 pt denies  GIST (gastrointestinal stromal tumor), non-malignant    Glaucoma    Glaucoma (ICD9 365.9)  bilateral  HTN (hypertension)    Leg pain, left  10/08/19 ; pt denies  Osteoarthritis of hip

## 2019-10-09 NOTE — ED PROVIDER NOTE - OBJECTIVE STATEMENT
63yo M with pmhx htn, recent achilles tendon rupture s/p surgical repair and circumfrential casting yesterday with podiatry, presenting to ER today for pain and tightness sensation in achilles and posterior leg. No tingling sensation or other complaints

## 2019-10-10 PROBLEM — M79.605 PAIN IN LEFT LEG: Chronic | Status: ACTIVE | Noted: 2018-02-27

## 2019-10-10 PROBLEM — M16.9 OSTEOARTHRITIS OF HIP, UNSPECIFIED: Chronic | Status: ACTIVE | Noted: 2018-02-27

## 2019-10-10 PROBLEM — I10 ESSENTIAL (PRIMARY) HYPERTENSION: Chronic | Status: ACTIVE | Noted: 2018-02-27

## 2019-10-10 PROBLEM — H40.9 UNSPECIFIED GLAUCOMA: Chronic | Status: ACTIVE | Noted: 2018-02-28

## 2019-10-10 PROBLEM — E53.9 VITAMIN B DEFICIENCY, UNSPECIFIED: Chronic | Status: ACTIVE | Noted: 2018-02-27

## 2019-10-10 PROBLEM — D21.4 BENIGN NEOPLASM OF CONNECTIVE AND OTHER SOFT TISSUE OF ABDOMEN: Chronic | Status: ACTIVE | Noted: 2017-05-15

## 2021-03-05 NOTE — PHYSICAL THERAPY INITIAL EVALUATION ADULT - PATIENT/FAMILY/SIGNIFICANT OTHER GOALS STATEMENT, PT EVAL
return to prior level of function Instructions: This plan will send the code FBSE to the PM system.  DO NOT or CHANGE the price. Detail Level: Simple Price (Do Not Change): 0.00

## 2022-02-09 NOTE — PATIENT PROFILE ADULT. - CAREGIVER NAME
Becky Babcock Patient Age: 55 year old  MESSAGE: Interpreting service used: No      Patient calling stating that she is needing a new order for physical therapy at home. Patient is currently doing at home therapy with [facility], but is needing a new order. Order can be faxed to [phone]. Requesting a follow up call. Message routed to team for assistance.       ALLERGIES:  Patient has no allergy information on record.  Current Outpatient Medications   Medication   • HYDROcodone-acetaminophen (Norco) 5-325 MG per tablet   • HYDROcodone-acetaminophen (NORCO)  MG per tablet   • ibuprofen (MOTRIN) 800 MG tablet   • levonorgestrel (Mirena, 52 MG,) (52 MG) 20 MCG/DAY intrauterine device   • metoPROLOL succinate (TOPROL-XL) 50 MG 24 hr tablet   • Misc Natural Products (Glucosamine Chond Complex/MSM) Tab   • olmesartan (BENICAR) 40 MG tablet   • olmesartan-hydrochlorothiazide (BENICAR HCT) 40-25 MG per tablet   • Omega-3 Fatty Acids (Fish Oil) 875 MG Chew Tab   • spironolactone (ALDACTONE) 25 MG tablet   • TEMazepam (RESTORIL) 7.5 MG capsule   • diclofenac (VOLTAREN) 50 MG EC tablet   • atorvastatin (LIPITOR) 20 MG tablet   • Aspirin-Calcium Carbonate  MG Tab   • aspirin 81 MG EC tablet   • acetaminophen (TYLENOL) 500 MG tablet   • HYDROcodone-acetaminophen (Norco)  MG per tablet     No current facility-administered medications for this visit.     PHARMACY to use:           Pharmacy preference(s) on file:   [id number] - [place], [place] - [address]  [address]  [facility] [address]  Phone: [phone] Fax: [phone]      CALL BACK INFO: Ok to leave response (including medical information) with family member or on answering machine      PCP: Pcp Not In System         INS: Payor: [employer] / Plan: OPEN [id number] / Product Type: POS MISC   PATIENT ADDRESS:  [address] [place] [id number]     SAME

## 2022-03-23 NOTE — PATIENT PROFILE ADULT. - TEACHING/LEARNING FACTORS IMPACT ABILITY TO LEARN
FAMILY HISTORY:  Father  Still living? Unknown  Family history of diabetes mellitus (DM), Age at diagnosis: Age Unknown  FH: HTN (hypertension), Age at diagnosis: Age Unknown    
none

## 2022-05-02 NOTE — PHYSICAL THERAPY INITIAL EVALUATION ADULT - STANDING BALANCE: DYNAMIC, REHAB EVAL
Anesthesia Pre Eval Note    Anesthesia ROS/Med Hx        Anesthetic Complication History:  Patient does not have a history of anesthetic complications      Cardiovascular Review:  Exercise tolerance: good (>4 METS)  Positive for CAD  Positive for hypertension - well controlled  Positive for hyperlipidemia    GI/HEPATIC/RENAL Review:    Positive for GERD  Positive for renal disease - chronic renal insufficiency    End/Other Review:    Positive for anemia  Positive for chronic pain  Positive for cancer      Relevant Problems   No relevant active problems       Physical Exam     Airway   Mallampati: II  TM Distance: >3 FB  Neck ROM: Full  Neck: Able to place in sniff position  TMJ Mobility: Good    Cardiovascular  Cardiovascular exam normal  Cardio Rhythm: Regular  Cardio Rate: Normal    Head Assessment  Head assessment: Normocephalic and Atraumatic    General Assessment  General Assessment: Alert and oriented and No acute distress    Dental Exam  Dental exam normal    Pulmonary Exam  Pulmonary exam normal  Breath sounds clear to auscultation:  Yes    Abdominal Exam  Abdominal exam normal      Anesthesia Plan:    Phone call attempted:   No answer, Busy Signal    ASA Status: 3  Anesthesia Type: MAC    Induction: Intravenous  Preferred Airway Type: Nasal CannulaPatient has a difficult airway or is at risk of aspiration.   Maintenance: TIVA  Premedication: None      Post-op Pain Management: Per Surgeon      Checklist  Reviewed: Patient Summary, Care Everywhere, Allergies, Past Med History, DNR Status, Beta Blocker Status, Medications, Nursing Notes, Problem list, NPO Status, Outside Records, Consultations and EKG    
fair balance

## 2022-11-16 NOTE — ED ADULT TRIAGE NOTE - CHIEF COMPLAINT QUOTE
Pt was playing tennis and landed on left foot and heard a snap.  Unable to bear weight.  Left achilles area edematous. 16-Nov-2022 18:08

## 2023-05-06 NOTE — PRE-OP CHECKLIST - 1.
OFFICE VISIT    Patient: Nicole Shay   : 1964 MRN: [id number]    SUBJECTIVE:  Chief Complaint   Patient presents with   • Office Visit   • Shoulder Pain     Patient here with complaints of left shoulder pain off and on for 1.5 months.        Patient has given consent to record this visit for documentation in their clinical record.    A 59 year old female presents for an evaluation of left shoulder pain.    HISTORY OF PRESENT ILLNESS:    Historian: Self    Thoracic myofascial strain, initial encounter; Somatic dysfunction of thoracic region: Complains of left shoulder pain occasionally for one and half months. Mentions pain radiates from shoulder blade to back of left arm. Not much pain in elbow or shoulder. She could not work or sleep last week due to pain. Has no pain this week. No trauma. Mentions carrying heavy suitcase at time as she is a .     PAST MEDICAL HISTORY:  History reviewed. No pertinent past medical history.  MEDICATIONS:  Current Outpatient Medications   Medication Sig Dispense Refill   • escitalopram (LEXAPRO) 10 MG tablet Take 3 tablets by mouth daily. 90 tablet 2   • rosuvastatin (CRESTOR) 5 MG tablet TAKE 1 TABLET BY MOUTH ONCE DAILY 30 tablet 0   • semaglutide,0.25 or 0.5 mg/DOSE, (OZEMPIC) (1.34 mg/ml) 0.25 or 0.5 MG/DOSE injection Inject 0.25 mg into the skin every 7 days. For 4 weeks then increase to 0.5mg weekly 3 mL 3   • metroNIDAZOLE (METROCREAM) 0.75 % cream      • lisinopril (ZESTRIL) 20 MG tablet TAKE 1 TABLET BY MOUTH ONCE DAILY 90 tablet 0   • EPINEPHrine 0.3 MG/0.3ML auto-injector Inject 0.3 mLs into the muscle 1 time as needed for Anaphylaxis. 2 each 1   • cholecalciferol (cholecalciferol) 25 mcg (1,000 units) tablet Take 2 tablets by mouth daily. 60 tablet 11     No current facility-administered medications for this visit.     ALLERGIES:  Allergies as of 2023 - Reviewed 2023   Allergen Reaction Noted   • Avocado   (food or med) SWELLING  08/01/2018   • Banana   (food and med) RASH 08/01/2018   • Cantaloupe   (food) SWELLING 08/01/2018   • Fish allergy   (food or med) ANAPHYLAXIS    • Kiwi   (food or med) SWELLING 08/01/2018   • Latex HIVES and RASH    • Shellfish allergy   (food or med) ANAPHYLAXIS    • Sulfa antibiotics HIVES and RASH      FAMILY HISTORY:  Family History   Problem Relation Age of Onset   • Hypertension Mother    • Dementia/Alzheimers Mother    • Colon Polyps Mother    • Depression Mother    • Heart disease Father    • Diabetes Father    • Dementia/Alzheimers Father    • Depression Father      SOCIAL HISTORY:  Social History     Tobacco Use   • Smoking status: Never   • Smokeless tobacco: Never   Vaping Use   • Vaping status: never used   Substance Use Topics   • Alcohol use: Not Currently   • Drug use: Not Currently     Past Surgical HISTORY  Past Surgical History:   Procedure Laterality Date   • Breast reduction surgery Bilateral    • Breast surgery     • Colon surgery         REVIEW OF SYSTEMS:    Musculoskeletal: Per HPI.    OBJECTIVE:  Visit Vitals  BP (!) 150/80 (BP Location: LUE - Left upper extremity, Patient Position: Sitting, Cuff Size: Large Adult)   Pulse 72   Temp 97.6 °F (36.4 °C) (Temporal)   Resp 18   Ht 5' 5\"   Wt 108 kg (238 lb 1.6 oz)   SpO2 96%   BMI 39.62 kg/m²       PHYSICAL EXAM:    Constitutional: Alert, in no acute distress and current vital signs reviewed.   Head and Face: Atraumatic, no deformities, normocephalic, normal facies.   Eyes: No discharge, normal conjunctiva, no eyelid swelling, no ptosis and the sclerae were normal. Pupils equal, round and reactive to light and accommodation, conjugate gaze and extraocular movements were intact.   ENT: Normal appearing outer ear, normal appearing nose. Examination of the tympanic membrane showed normal landmarks, normal appearing external canal. Nasal mucosa moist and pink, no nasal discharge. Normal lips. Oral mucosa pink and moist, no oral lesions.   Neck:  Normal appearing neck, supple neck and no mass was seen. Thyroid not enlarged and no thyroid nodules.   Lymphatic: No lymphadenopathy.   Pulmonary: No respiratory distress, normal respiratory rate and effort and no accessory muscle use. Breath sounds clear to auscultation bilaterally.   Cardiovascular: Normal rate, no murmurs were heard, regular rhythm, normal S1 and normal S2. Edema was not present in the lower extremities.   Abdomen: Soft, nontender, nondistended, normal bowel sounds and no abdominal mass. No hepatomegaly and no splenomegaly. No umbilical hernia was discovered.   Musculoskeletal:  Full range of motion noted. Cervical spine, left shoulder, and left elbow noted no tenderness. Mild tenderness with spasm in the left paraspinal T4 to T7 area.     Neurologic: Cranial nerves grossly intact. Normal DTRs. No sensory deficits noted. No coordination deficits. Normal gait. Muscle strength and tone were normal.   Psychiatric: Oriented to person, oriented to place and oriented to time. Alert and awake, interactive and mood/affect were appropriate. judgment not impaired. Normal attention span. Short term memory intact.   Skin, Hair, Nails: Normal skin color and pigmentation and no rash. No foot ulcers and no skin ulcer was seen. Normal skin turgor. No clubbing or cyanosis of the fingernails.    DIAGNOSTIC STUDIES:  LAB RESULTS:  No visits with results within 1 Month(s) from this visit.   Latest known visit with results is:   Office Visit on 12/20/2022   Component Date Value Ref Range Status   • GRP A STREP 12/20/2022 Negative  Negative Final   • Internal Procedural Controls Accep* 12/20/2022 Yes   Final   • TEST LOT NUMBER 12/20/2022 [id number]   Final   • TEST LOT EXPIRATION DATE 12/20/2022 08/25/2023   Final   • CULTURE, STREPTOCOCCUS GROUP A (ST* 12/20/2022 No beta hemolytic Streptococcus isolated   Final       ASSESSMENT AND PLAN:  This 59 year old female presents with :  1. Thoracic myofascial strain, initial  encounter    2. Somatic dysfunction of thoracic region        No orders of the defined types were placed in this encounter.      PLAN:    Thoracic myofascial strain, initial encounter; Somatic dysfunction of thoracic region:  Most likely appears musculoskeletal in origin not in joint.   she does carry a heavy suitcase at times since she's a , most likely this is mechanically induced.   I did perform Osteopathic Manipulative Therapy due to somatic dysfunction in thoracic spine. Used in myofascial and high velocity and low amplitude technique with excellent results.  This worked for her with much improvement in her symptoms.     Advised to follow up in two to three weeks if symptoms worsen or fail to improve.    Patient understand and agrees the plan.     Refer to orders.  Medical compliance with plan discussed and risks of non-compliance reviewed.  Patient education completed on disease process, etiology & prognosis.  Proper usage and side effects of medications reviewed & discussed.  Return to clinic as clinically indicated as discussed with the patient who verbalized understanding of the plan and is in agreement with the plan.    I,  Dr. Sunita An, have created a visit summary document based on the audio recording between Dr. Enio Baca DO and this patient for the physician to review, edit as needed, and authenticate.    Creation Date: 5/6/2023     I have reviewed and edited the visit summary above and attest that it is accurate.       NO DIABETES

## 2023-05-24 NOTE — H&P PST ADULT - BACK
[facility] PROGRESS NOTE        Attending:   Dr. Jacobson    Student:   Sage Odom, Student    PATIENT:   Gurdeep Guerra; [id number]; 1968    SUBJECTIVE:   Hospital Course: Patient is 54 y/o M w/ HTN, AS, ESRD secondary to FSGS, Dyslipidemia, GERD, 30 year hx of anabolic steroid abuse, and PD catheter placement on 5/15/23, who was admitted for management of an NSTEMI. Patient currently undergoing pharmacological treatment for NSTEMI and HTN. Cardiology currently consulting as well, with possible heart catheterization.     Interval: Today patient states his chest pain has improved. He states he hasn't had chest pain since his initial dose of Nitro yesterday. Patient also denies dyspnea at this time. His headache has improved. No difficulty urinating. For the past 6 months, he has had SOB with exertion as well as paroxysmal nocturnal dyspnea. Patient spoke with Cardiologist this morning regarding his diagnosis, and need for heart catheterization. Patient would like to think more about his treatment options before proceeding with the heart catheterization    OBJECTIVE:  Vitals:    05/24/23 0449 05/24/23 0849 05/24/23 1148 05/24/23 1208   BP: 102/56 (!) 161/92 (!) 172/101 (!) 165/93   Pulse: 68 65  68   Resp: 17 17  18   Temp: 37.2 °C (99 °F) 36.1 °C (97 °F)  36.6 °C (97.9 °F)   TempSrc: Temporal Temporal  Temporal   SpO2: 96% 96%  98%   Weight:       Height:           Intake/Output Summary (Last 24 hours) at 5/24/2023 0821  Last data filed at 5/24/2023 0516  Gross per 24 hour   Intake --   Output 1100 ml   Net -1100 ml       PHYSICAL EXAM:  General: Pt resting in NAD, cooperative   Skin:  Pink, warm and dry.  No rashes  HEENT: NC/AT. EOMI. MMM. No nasal discharge.   Neck:  No JVD   Lungs:  Symmetrical.  CTAB with no W/R/R.  Good air movement   Cardiovascular:  Holosystolic murmur of in all areas, radiating to the carotids  Abdomen:  Peritoneal Dialysis Catheter in place, with no surrounding erythema. Palpable  internal abdominal tubing  Extremities:  Full range of motion. No gross deformities noted.   CNS:  Muscle tone is normal.     LABS:  Recent Labs     05/23/23  1108 05/24/23  0111   WBC 5.3 5.2   RBC 3.99* 3.59*   HEMOGLOBIN 12.7* 11.5*   HEMATOCRIT 37.6* 32.8*   MCV 94.2 91.4   MCH 31.8 32.0   RDW 45.2 43.7   PLATELETCT 161* 154*   MPV 9.6 9.6   NEUTSPOLYS 82.30*  --    LYMPHOCYTES 9.10*  --    MONOCYTES 6.50  --    EOSINOPHILS 1.30  --    BASOPHILS 0.40  --      Recent Labs     05/23/23  1108 05/24/23  0111   SODIUM 138 141   POTASSIUM 5.0 4.5   CHLORIDE 104 109   CO2 17* 16*   * 109*   CREATININE 9.34* 9.45*   CALCIUM 8.6 8.7   MAGNESIUM  --  1.6   PHOSPHORUS  --  6.4*   ALBUMIN 3.4 2.9*     Estimated GFR/CRCL = Estimated Creatinine Clearance: 8.2 mL/min (A) (by C-G formula based on SCr of 9.45 mg/dL ()).  Recent Labs     05/23/23  1108 05/24/23  0111   GLUCOSE 93 81     Recent Labs     05/23/23  1108 05/23/23  1558 05/24/23  0111   ASTSGOT 25  --  18   ALTSGPT 10  --  12   TBILIRUBIN 0.3  --  0.4   ALKPHOSPHAT 56  --  45   GLOBULIN 2.9  --  2.4   INR  --  1.20*  --              Recent Labs     05/23/23  1558   INR 1.20*   APTT 28.6         IMAGING:  EC-ECHOCARDIOGRAM COMPLETE W/O CONT   Final Result      DX-CHEST-PORTABLE (1 VIEW)   Final Result      Cardiomegaly. No focal consolidation or pleural effusions.          MEDS:  Current Facility-Administered Medications   Medication Last Admin    amLODIPine (NORVASC) tablet 10 mg 10 mg at 05/24/23 1148    sodium bicarbonate tablet 1,300 mg 1,300 mg at 05/24/23 1148    sevelamer carbonate (RENVELA) tablet 800 mg 800 mg at 05/24/23 1148    nitroglycerin (NITROSTAT) tablet 0.4 mg      senna-docusate (PERICOLACE or SENOKOT S) 8.6-50 MG per tablet 2 Tablet 2 Tablet at 05/24/23 0512    And    polyethylene glycol/lytes (MIRALAX) PACKET 1 Packet      And    magnesium hydroxide (MILK OF MAGNESIA) suspension 30 mL      And    bisacodyl (DULCOLAX) suppository 10 mg       acetaminophen (Tylenol) tablet 650 mg      nitroglycerin (NITROSTAT) tablet 0.4 mg      atorvastatin (LIPITOR) tablet 80 mg 80 mg at 05/23/23 1749    heparin infusion 25,000 units in 500 mL 0.45% NACL 14 Units/kg/hr at 05/24/23 1251    heparin injection 2,000 Units 2,000 Units at 05/23/23 2202    aspirin EC tablet 81 mg 81 mg at 05/24/23 0511    omeprazole (PRILOSEC) capsule 20 mg 20 mg at 05/23/23 2205    tamsulosin (FLOMAX) capsule 0.4 mg 0.4 mg at 05/23/23 1749    morphine 4 MG/ML injection 1 mg      metoprolol tartrate (LOPRESSOR) tablet 50 mg 50 mg at 05/24/23 0511    labetalol (NORMODYNE/TRANDATE) injection 10 mg         ASSESSMENT/PLAN:  Patient is a 55 y.o. male with HTN, Aortic Stenosis, Dyslipidemia, Stage V CKD secondary to FSGS, 30 year history of anabolic steroid use, and PD catheter placement on 5/15/23  who was admitted on 5/23 for management of NSTEMI    #NSTEMI  Patient presented with sudden-onset, constant, pressure-like Chest pain, with associating diaphoresis, and SOB, most consistent with a ischemia. Initial BNP of 7300, troponin of 294. Troponin has continued to increase. EKG showed no ST elevation, though with evidence of LVH. This is most consistent with a NSTEMI. CXR showed cardiomegaly. ECHO showed EF of 60%, Mild MR, AV stenosis with Aortic Insufficiency and Tricuspid Reurgitation.CORNELL score of 4. Patient has used ACE inhibitors and ARBs in the past, with adverse side effects including an increase in K+ and muscle cramping.               - Pharmacological Management                          - Aspirin 81 mg PO qd                          - Atorvastatin 80 mg PO qd                          - Nitroglycerin 0.4 mg SL                          - Heparin IV Drip                          - Metoprolol 50 mg PO BID              - Cardiology Consult               - Recommended continued management of patient BP  - Recommends continued observation for possible  Heart Catheterization at this time  -  Continue to trend Troponin     # Stage 5 CKD  Previous diagnosis. Peritoneal Catheter placed on 5/15. Chronically elevated Cr, baseline increased from 3-4 to 6-8 in the past year. Cr on admission 9.34, has remained stable               - Nephrology Consult  - No need for RRT at this time              - Continue to monitor electrolytes              - Continue to monitor Cr    #Hypertension  Previous diagnosis. Patient was hypertensive during initial episode of CP. Patient has used ACE inhibitors and ARBs in the past, with adverse side effects including an increase in K+ and muscle cramping. Patient currently on Ca Channel blocker   - Pharmacological Management  - Metoprolol 50 mg PO BID  - Amlodipine 10mg PO qd  - Cardiology Consult    #Hyperphosphatemia  Phosphorus 6.4 on 5/24. Likely secondary to ESRD   - Sevelamer 800 mg 3x daily with meals     #Dyslipidemia  Previous Diagnosis. Likely contributing to coronary pathology. Lipid Profile shows elevated LDL              - Atorvastatin 80 mg PO qd     # GERD  Previous Diagnosis.               - Continue Esomeprazole 40 mg PO qd     Disposition: Inpatient for continued evaluation and management of NSTEMI    Sage Odom, Student    No deformity or limitation of movement

## 2023-08-14 NOTE — ED ADULT NURSE NOTE - NSIMPLEMENTINTERV_GEN_ALL_ED
left side chest pain/pain, chest Implemented All Fall Risk Interventions:  [place] to call system. Call bell, personal items and telephone within reach. Instruct patient to call for assistance. Room bathroom lighting operational. Non-slip footwear when patient is off stretcher. Physically safe environment: no spills, clutter or unnecessary equipment. Stretcher in lowest position, wheels locked, appropriate side rails in place. Provide visual cue, wrist band, yellow gown, etc. Monitor gait and stability. Monitor for mental status changes and reorient to person, place, and time. Review medications for side effects contributing to fall risk. Reinforce activity limits and safety measures with patient and family.

## 2024-03-08 ENCOUNTER — EMERGENCY (EMERGENCY)
Facility: HOSPITAL | Age: 69
LOS: 1 days | Discharge: HOME | End: 2024-03-08
Attending: EMERGENCY MEDICINE | Admitting: EMERGENCY MEDICINE
Payer: MEDICARE

## 2024-03-08 VITALS
DIASTOLIC BLOOD PRESSURE: 93 MMHG | OXYGEN SATURATION: 100 % | TEMPERATURE: 98 F | SYSTOLIC BLOOD PRESSURE: 157 MMHG | HEART RATE: 64 BPM | RESPIRATION RATE: 16 BRPM

## 2024-03-08 VITALS
RESPIRATION RATE: 19 BRPM | OXYGEN SATURATION: 100 % | TEMPERATURE: 98 F | SYSTOLIC BLOOD PRESSURE: 149 MMHG | DIASTOLIC BLOOD PRESSURE: 99 MMHG | HEART RATE: 62 BPM

## 2024-03-08 DIAGNOSIS — Z98.890 OTHER SPECIFIED POSTPROCEDURAL STATES: Chronic | ICD-10-CM

## 2024-03-08 DIAGNOSIS — D13.1 BENIGN NEOPLASM OF STOMACH: Chronic | ICD-10-CM

## 2024-03-08 LAB
ALBUMIN SERPL ELPH-MCNC: 4.2 G/DL — SIGNIFICANT CHANGE UP (ref 3.3–5)
ALP SERPL-CCNC: 101 U/L — SIGNIFICANT CHANGE UP (ref 40–120)
ALT FLD-CCNC: 20 U/L — SIGNIFICANT CHANGE UP (ref 4–41)
ANION GAP SERPL CALC-SCNC: 10 MMOL/L — SIGNIFICANT CHANGE UP (ref 7–14)
APTT BLD: 32.6 SEC — SIGNIFICANT CHANGE UP (ref 24.5–35.6)
AST SERPL-CCNC: 20 U/L — SIGNIFICANT CHANGE UP (ref 4–40)
BASOPHILS # BLD AUTO: 0.04 K/UL — SIGNIFICANT CHANGE UP (ref 0–0.2)
BASOPHILS NFR BLD AUTO: 0.8 % — SIGNIFICANT CHANGE UP (ref 0–2)
BILIRUB SERPL-MCNC: 0.9 MG/DL — SIGNIFICANT CHANGE UP (ref 0.2–1.2)
BUN SERPL-MCNC: 9 MG/DL — SIGNIFICANT CHANGE UP (ref 7–23)
CALCIUM SERPL-MCNC: 9.2 MG/DL — SIGNIFICANT CHANGE UP (ref 8.4–10.5)
CHLORIDE SERPL-SCNC: 104 MMOL/L — SIGNIFICANT CHANGE UP (ref 98–107)
CO2 SERPL-SCNC: 25 MMOL/L — SIGNIFICANT CHANGE UP (ref 22–31)
CREAT SERPL-MCNC: 1.12 MG/DL — SIGNIFICANT CHANGE UP (ref 0.5–1.3)
D DIMER BLD IA.RAPID-MCNC: 299 NG/ML DDU — HIGH
EGFR: 71 ML/MIN/1.73M2 — SIGNIFICANT CHANGE UP
EOSINOPHIL # BLD AUTO: 0.2 K/UL — SIGNIFICANT CHANGE UP (ref 0–0.5)
EOSINOPHIL NFR BLD AUTO: 4.1 % — SIGNIFICANT CHANGE UP (ref 0–6)
GLUCOSE SERPL-MCNC: 97 MG/DL — SIGNIFICANT CHANGE UP (ref 70–99)
HCT VFR BLD CALC: 37.1 % — LOW (ref 39–50)
HGB BLD-MCNC: 12.3 G/DL — LOW (ref 13–17)
IANC: 2.97 K/UL — SIGNIFICANT CHANGE UP (ref 1.8–7.4)
IMM GRANULOCYTES NFR BLD AUTO: 0.2 % — SIGNIFICANT CHANGE UP (ref 0–0.9)
INR BLD: 0.92 RATIO — SIGNIFICANT CHANGE UP (ref 0.85–1.18)
LYMPHOCYTES # BLD AUTO: 1.02 K/UL — SIGNIFICANT CHANGE UP (ref 1–3.3)
LYMPHOCYTES # BLD AUTO: 20.9 % — SIGNIFICANT CHANGE UP (ref 13–44)
MCHC RBC-ENTMCNC: 29.4 PG — SIGNIFICANT CHANGE UP (ref 27–34)
MCHC RBC-ENTMCNC: 33.2 GM/DL — SIGNIFICANT CHANGE UP (ref 32–36)
MCV RBC AUTO: 88.8 FL — SIGNIFICANT CHANGE UP (ref 80–100)
MONOCYTES # BLD AUTO: 0.64 K/UL — SIGNIFICANT CHANGE UP (ref 0–0.9)
MONOCYTES NFR BLD AUTO: 13.1 % — SIGNIFICANT CHANGE UP (ref 2–14)
NEUTROPHILS # BLD AUTO: 2.97 K/UL — SIGNIFICANT CHANGE UP (ref 1.8–7.4)
NEUTROPHILS NFR BLD AUTO: 60.9 % — SIGNIFICANT CHANGE UP (ref 43–77)
NRBC # BLD: 0 /100 WBCS — SIGNIFICANT CHANGE UP (ref 0–0)
NRBC # FLD: 0 K/UL — SIGNIFICANT CHANGE UP (ref 0–0)
PLATELET # BLD AUTO: 238 K/UL — SIGNIFICANT CHANGE UP (ref 150–400)
POTASSIUM SERPL-MCNC: 4.2 MMOL/L — SIGNIFICANT CHANGE UP (ref 3.5–5.3)
POTASSIUM SERPL-SCNC: 4.2 MMOL/L — SIGNIFICANT CHANGE UP (ref 3.5–5.3)
PROT SERPL-MCNC: 7.2 G/DL — SIGNIFICANT CHANGE UP (ref 6–8.3)
PROTHROM AB SERPL-ACNC: 10.4 SEC — SIGNIFICANT CHANGE UP (ref 9.5–13)
RBC # BLD: 4.18 M/UL — LOW (ref 4.2–5.8)
RBC # FLD: 13.7 % — SIGNIFICANT CHANGE UP (ref 10.3–14.5)
SODIUM SERPL-SCNC: 139 MMOL/L — SIGNIFICANT CHANGE UP (ref 135–145)
TROPONIN T, HIGH SENSITIVITY RESULT: 10 NG/L — SIGNIFICANT CHANGE UP
TROPONIN T, HIGH SENSITIVITY RESULT: 11 NG/L — SIGNIFICANT CHANGE UP
WBC # BLD: 4.88 K/UL — SIGNIFICANT CHANGE UP (ref 3.8–10.5)
WBC # FLD AUTO: 4.88 K/UL — SIGNIFICANT CHANGE UP (ref 3.8–10.5)

## 2024-03-08 PROCEDURE — 71046 X-RAY EXAM CHEST 2 VIEWS: CPT | Mod: 26

## 2024-03-08 PROCEDURE — 93010 ELECTROCARDIOGRAM REPORT: CPT

## 2024-03-08 PROCEDURE — 99285 EMERGENCY DEPT VISIT HI MDM: CPT | Mod: GC

## 2024-03-08 PROCEDURE — 71275 CT ANGIOGRAPHY CHEST: CPT | Mod: 26,MC

## 2024-03-08 RX ORDER — ACETAMINOPHEN 500 MG
650 TABLET ORAL ONCE
Refills: 0 | Status: COMPLETED | OUTPATIENT
Start: 2024-03-08 | End: 2024-03-08

## 2024-03-08 RX ORDER — IBUPROFEN 200 MG
600 TABLET ORAL ONCE
Refills: 0 | Status: COMPLETED | OUTPATIENT
Start: 2024-03-08 | End: 2024-03-08

## 2024-03-08 RX ADMIN — Medication 650 MILLIGRAM(S): at 07:41

## 2024-03-08 RX ADMIN — Medication 600 MILLIGRAM(S): at 07:41

## 2024-03-08 NOTE — ED PROVIDER NOTE - NSFOLLOWUPINSTRUCTIONS_ED_ALL_ED_FT
You were seen in the emergency department for chest pain. We have evaluated you and determined that you do not require further hospital interventions.    During your stay you had the following relevant results: an EKG and troponin (blood test) that do not show evidence of a heart attack, a chest X-ray that does not show evidence of a lung infection    Please follow up with a CARDIOLOGIST to discuss the results of your stay in our department.    If you start to experience worsening symptoms such as persistent chest pain, nausea or vomiting, difficulty breathing, please return to the emergency department for further evaluation. YOU WERE SEEN IN THE ER FOR CHEST/NECK PAIN THAT IS THOUGHT TO BE MUSCULAR IN NATURE.  PLEASE CONTINUE TO TAKE IBUPROFEN AND TYLENOL TOGETHER FOR PAIN RELIEF.  YOU CAN TAKE IBUPROFEN 600 MG AND TYLENOL 650-1000 MG EVERY 6 HOURS AS NEEDED FOR PAIN.    YOUR CT SCAN SHOWS FINDINGS IN YOUR LUNG THAT MUST BE FOLLOWED UP.  IT ALSO SHOWS NODULES IN YOUR ADRENAL GLAND AND SLIGHT CHANGES IN YOUR AORTA.  THESE FINDINGS ARE DISCUSSED ON YOUR CT SCAN REPORT THAT YOU RECEIVED, AND YOU WILL NEED TO SHOW THEM TO YOUR DOCTOR'S OFFICE FOR FOLLOW UP.    YOUR DOCTOR'S OFFICE WAS CALLED AND IS AWARE OF THE RESULTS AND PLAN, AND THEY WILL CALL YOU TO SCHEDULE A FOLLOW UP APPOINTMENT.  PLEASE TAKE ALL OF YOUR RESULTS TO YOUR DOCTOR'S OFFICE FOR FOLLOW UP.    PLEASE RETURN TO THE ER AT ANY TIME FOR ANY WORSENING SYMPTOMS OR CONCERNS.

## 2024-03-08 NOTE — ED PROVIDER NOTE - OBJECTIVE STATEMENT
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  Patient states he was recently in [place] and a little bit after moving some furniture he felt some left-sided neck stiffness which turned into shoulder pain and then a little bit later felt left-sided chest pressure.  He states at that point he started taking Tylenol which seemed to help with his symptoms although did not resolve completely and were exacerbated by lifting his arm into the air.  He took a train from [place] in which she had a cart where he was able to sleep and notes he was able to get up and go to the bathroom over this train ride.  Also notes that he has had mild shortness of breath mostly just when he bends over. Chest pain/SOB is not exacerbated by exertion. Notes that the discomfort has significantly improved over the past 3 days with Tylenol, last dose 6:43 PM last night.  He denies palpitations, abdominal pain, lightheadedness, blurred vision. Denies any history of similar episodes. No history of DVT or other blood clots/PE.  Has seen a Cardiologist many years ago and states he had a normal stress test/echo at that time.

## 2024-03-08 NOTE — ED PROVIDER NOTE - PROGRESS NOTE DETAILS
Josafat Alford DO (PGY-1) Patient reevaluated at bedside. Patient is doing well, states minimal discomfort at this time. DDimer 299 will obtain CT PE study. Explained the above to patient and his wife, they are amenable with this plan. Dr. Lora: CT PE study showing no PE but showing, "Airway associated patchy groundglass opacity in left upper lobe with associated tree-in-bud nodules, likely impacted distal airway. Attention   to this region is recommended on follow-up imaging.  Ascending thoracic aorta ectasia  Left adrenal nodules. For further evaluation CT/MRI abdomenw/pelvis with adrenal nodule protocol is recommended."  WBC normal, no fever, pt notes that he feels significant improvement in pain after acetaminophen/ibuprofen. Dr. Lora: CT PE study showing no PE but showing, "Airway associated patchy groundglass opacity in left upper lobe with associated tree-in-bud nodules, likely impacted distal airway. Attention   to this region is recommended on follow-up imaging.  Ascending thoracic aorta ectasia  Left adrenal nodules. For further evaluation CT/MRI abdomenw/pelvis with adrenal nodule protocol is recommended."  WBC normal, no fever, pt notes that he feels significant improvement in pain after acetaminophen/ibuprofen.  Clinically pt does NOT present like an infectious presentation, and I would like to avoid abx if possible.  Pt aware of findings, states he will follow up with his PCP Dr. Lopez next week to discuss next steps.  I will call Dr. Lopez to discuss. Dr. Lora: spoke to Dr. So, covering doctor at pt's PCP, aware of pulmonary findings, adrenal nodules and aortic ectasia, aware that pt being discharged without abx due to low suspicion for infectious cause; Dr. So's office will call to discuss with pt and schedule appointment for follow up; I will give pt copies of results to take to office Dr. Lora: pt states that PCP follow up for next Tuesday (4 days from now) Dr. Lora: pt states that he scheduled PCP follow up for next Tuesday (4 days from now)

## 2024-03-08 NOTE — ED PROVIDER NOTE - DIFFERENTIAL DIAGNOSIS
Differential Diagnosis musculoskeletal pain (most likely), shoulder pathology, unlikely ACS (not exertional, no associated other symptoms like diaphoresis or dizziness), less likely PE, even with history of well controlled GIST and recent train trip (pt with low Wells score, recently on a train but was getting up at normal intervals to use bathroom, etc) but pt will have D dimer drawn

## 2024-03-08 NOTE — ED ADULT TRIAGE NOTE - CHIEF COMPLAINT QUOTE
Pt c/o left sided chest pain x 2 days. pt states it feels like a pressure. Pt states he has been on a train from [place] for the last 2 days. has been taking tylenol for the pain. No complaints of chest pain, headache, nausea, dizziness, vomiting  SOB, fever, chills verbalized..

## 2024-03-08 NOTE — ED ADULT NURSE REASSESSMENT NOTE - NS ED NURSE REASSESS COMMENT FT1
Pt is A&Ox4, ambulatory, Hx of HTN. Pt appears comfortable. Denies chest pain/pressure or SOB at this time. offers no other complaints. breathing is even and nonlabored. Plan of care ongoing. Stretcher set in lowest position, call bell within reach, safety maintained.

## 2024-03-08 NOTE — ED PROVIDER NOTE - ATTENDING CONTRIBUTION TO CARE
Dr. Lora: 67 yo male with PMH HTN, glaucoma, GIST (on imatinib), in ED with 3 days of pain to left side of neck and shoulder.  Pt intermittently with SOB, non exertional, not related to neck/chest pain.  Pt states that pain began at some time shortly after he was moving furniture in his home, thinks that may be related to his pain.  Pt returned last night from a 2 day train ride to [facility] from [place], but he states he was able to get up as desired to use the bathroom, etc.  No N/V/D, abdominal pain or other acute complaints.  Pt did take acetaminophen for this pain, with relief, but pain returns when acetaminophen returns.  On exam pt well appearing, in NAD, heart RRR, lungs CTAB, abd NTND, extremities without swelling, strength 5/5 in all extremities and skin without rash.  Pt able to reproduce pain with movement of left arm.  EKG without acute ischemic changes.    I, Josep Lora MD, personally made/approved the management plan for this patient and take responsibility for the patient's management.

## 2024-03-08 NOTE — ED ADULT NURSE NOTE - OBJECTIVE STATEMENT
Received pt in intake. Pt is A&Ox4 with past medical history of HTN, Glaucoma. Pt came to the ED due to having chest pain for the past 2 days. pt reports that the chest pain started on his left upper chest and then radiated to his left shoulder. Pt breathing is equal and nonlabored. Pt abdomen is soft and nondistended. Pt denies any headache, nausea, vomiting, diarrhea, fever or chills. Pt has a 20g to the Left AC. Pt safety maintained.

## 2024-03-08 NOTE — ED PROVIDER NOTE - CLINICAL SUMMARY MEDICAL DECISION MAKING FREE TEXT BOX
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  Patient states he was recently in [place] and a little bit after moving some furniture he felt some left-sided neck stiffness which turned into shoulder pain and then a little bit later felt left-sided chest pressure.  He states at that point he started taking Tylenol which seemed to help with his symptoms although did not resolve completely and were exacerbated by lifting his arm into the air.  He took a train from [place] in which she had a cart where he was able to sleep and notes he was able to get up and go to the bathroom over this train ride.  Also notes that he has had mild shortness of breath mostly just when he bends over. Chest pain/SOB is not exacerbated by exertion. Notes that the discomfort has significantly improved over the past 3 days with Tylenol, last dose 6:43 PM last night.  He denies palpitations, abdominal pain, lightheadedness, blurred vision. Denies any history of similar episodes. No history of DVT or other blood clots/PE.  Has seen a Cardiologist many years ago and states he had a normal stress test/echo at that time.  VSS  On exam patient is well appearing with CP reproducable by raising arm over his head  EKG NSR NO ALTAGRACIA, no STD, no tw abn.   DDX included but not limited to- Most likely Musculoskeletal chest pain in the setting of having moved heavy furniture prior to onset.  However in the setting of his past medical history patient may be at risk of ischemic heart disease, despite history of present illness being less suspicious for ACS will obtain screening troponin to evaluate for ischemia.  My suspicion for PE is relatively low and he is low risk via Wells criteria as he does not meet the 3 days of immobilization group but he does meet the history of cancer group he is relatively low risk but close enough to moderate at which point I would attempt to PERC him out but given age patient cannot be perked out will obtain a screening D-dimer.  Will treat pain with Tylenol/ibuprofen.  I discussed the above plan with the patient and he is amenable. 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  Patient states he was recently in [place] and a little bit after moving some furniture he felt some left-sided neck stiffness which turned into shoulder pain and then a little bit later felt left-sided chest pressure.  He states at that point he started taking Tylenol which seemed to help with his symptoms although did not resolve completely and were exacerbated by lifting his arm into the air.  He took a train from [place] in which she had a cart where he was able to sleep and notes he was able to get up and go to the bathroom over this train ride.  Also notes that he has had mild shortness of breath mostly just when he bends over. Chest pain/SOB is not exacerbated by exertion. Notes that the discomfort has significantly improved over the past 3 days with Tylenol, last dose 6:43 PM last night.  He denies palpitations, abdominal pain, lightheadedness, blurred vision. Denies any history of similar episodes. No history of DVT or other blood clots/PE.  Has seen a Cardiologist many years ago and states he had a normal stress test/echo at that time.  VSS  On exam patient is well appearing with CP reproducible by raising arm over his head  EKG NSR NO ALTAGRACIA, no STD, no tw abn.   DDX included but not limited to- Most likely Musculoskeletal chest pain in the setting of having moved heavy furniture prior to onset.  However in the setting of his past medical history patient may be at risk of ischemic heart disease, despite history of present illness being less suspicious for ACS will obtain screening troponin to evaluate for ischemia.  My suspicion for PE is relatively low and he is low risk via Wells criteria as he does not meet the 3 days of immobilization group but he does meet the history of cancer group he is relatively low risk but close enough to moderate at which point I would attempt to PERC him out but given age patient cannot be perked out will obtain a screening D-dimer.  Will treat pain with Tylenol/ibuprofen.  I discussed the above plan with the patient and he is amenable.

## 2024-03-08 NOTE — ED ADULT NURSE REASSESSMENT NOTE - NS ED NURSE REASSESS COMMENT FT1
Pt resting in stretcher, appears comfortable, offers no complaints at this time. breathing is even and nonlabored. Denies pain or SOB. NSR on cardiac monitor. Plan of care ongoing. Stretcher set in lowest position, safety maintained.

## 2024-03-08 NOTE — ED PROVIDER NOTE - PATIENT PORTAL LINK FT
You can access the FollowMyHealth Patient Portal offered by [facility] by registering at the following website: [email or web address]. By joining [facility]’s FollowMyHealth portal, you will also be able to view your health information using other applications (apps) compatible with our system.

## 2024-03-08 NOTE — ED PROVIDER NOTE - PHYSICAL EXAMINATION
General: well appearing, interactive, well nourished, no apparent distress, ncat  HEENT: EOMI, PERRLA, normal mucosa, normal oropharynx, no lesions on the lips or on oral mucosa, normal external ear  Neck: supple, no lymphadenopathy, full range of motion, no nuchal rigidity  CV: RRR, normal S1 and S2 with no murmur, capillary refill less than two seconds  Resp: lungs CTA b/l, good aeration bilaterally, symmetric chest wall   Abd: non-distended, soft, non-tender  : no CVA tenderness  MSK: +Reproducible chest pressure with rasing arm over his head.  full range of motion, no cyanosis, no edema, no clubbing, no immobility  Neuro: CN II-XII grossly intact, muscle strength 5/5 in all extremities, normal gait  Skin: no rashes, skin intact

## 2024-03-08 NOTE — ED PROVIDER NOTE - NSICDXPASTMEDICALHX_GEN_ALL_CORE_FT
PAST MEDICAL HISTORY:  Bulging disc lumbar spine 2013    Burning feet syndrome 10/08/19 pt denies    GIST (gastrointestinal stromal tumor), non-malignant     Glaucoma     Glaucoma (ICD9 365.9) bilateral    HTN (hypertension)     Leg pain, left 10/08/19 ; pt denies    Osteoarthritis of hip

## 2024-08-22 NOTE — PRE-OP CHECKLIST - WEIGHT IN LBS
Department of Anesthesiology  Preprocedure Note       Name:  Rodolfo Benjamin   Age:  33 y.o.  :  1991                                          MRN:  [id number]         Date:  2024      Surgeon: Surgeon(s):  Jeremiah Rincon MD    Procedure: Procedure(s):  LEFT EYE PARS PLANA VITRECTOMY MEMBRANE PEEL ENDOLASER AIR 25G    Medications prior to admission:   Prior to Admission medications    Medication Sig Start Date End Date Taking? Authorizing Provider   insulin lispro (HUMALOG,ADMELOG) 100 UNIT/ML SOLN injection vial Inject 100 Units into the skin Daily Via pump 24   Aminata Powers APRN - CNP   sodium zirconium cyclosilicate (LOKELMA) 10 g PACK oral suspension Take 1 packet by mouth daily 24   Tatiana Okeefe MD   sodium bicarbonate 650 MG tablet Take 12 tablets by mouth 3 times daily He is to take 12 tablets daily. 7/15/24 7/15/25  Tatiana Okeefe MD   furosemide (LASIX) 40 MG tablet Take 1 tablet by mouth daily 7/15/24   Tatiana Okeefe MD   ondansetron (ZOFRAN-ODT) 4 MG disintegrating tablet Take 1 tablet by mouth 3 times daily as needed for Nausea or Vomiting 24   Corazon Corbin DO   metoprolol succinate (TOPROL XL) 50 MG extended release tablet Take 1 tablet by mouth 2 times daily 24  Corazon Corbin DO   gabapentin (NEURONTIN) 300 MG capsule Take 1 capsule by mouth in the morning and at bedtime for 90 days. 24  Corazon Corbin DO   Buprenorphine HCl-Naloxone HCl (SUBOXONE SL) Place 1 Film under the tongue 2 times daily Max Daily Amount: 2 Film    Tatiana Okeefe MD   glucagon 1 MG injection Inject 1 mg into the muscle See Admin Instructions Follow package directions for low blood sugar. 24   Aminata Powers APRN - CNP   aspirin 81 MG EC tablet Take 1 tablet by mouth daily 24   Corazon Corbin DO   amLODIPine (NORVASC) 5 MG tablet Take 1 tablet by mouth daily  Patient taking differently: Take 1 tablet by 
175

## 2024-11-08 ENCOUNTER — APPOINTMENT (OUTPATIENT)
Dept: OTOLARYNGOLOGY | Facility: CLINIC | Age: 69
End: 2024-11-08

## 2025-02-27 NOTE — ED PROVIDER NOTE - RESPIRATORY NEGATIVE STATEMENT, MLM
[FreeTextEntry1] : DIMITRIS DAILY is a 20-year-old male with bilateral knee pain.    I discussed with the patient that their symptoms, signs, and imaging are most consistent with right patellar tendonitis, possible fat pad impingement, ITB syndrome, and left knee biceps femoris tendinosis. We reviewed the natural history of this condition and treatment options. We agreed on the following plan:   X-ray of bilateral knees taken today. Encouraged to continue home exercises per handout. Continue physical therapy. New referral provided. Imaging: As patient has completed 6 weeks of   home exercise and physical therapy with minimal symptomatic improvement, we will obtain MRI of bilateral knees. Follow up after imaging.
[FreeTextEntry1] : DIMITRIS DAILY is a 20-year-old male with bilateral knee pain.    I discussed with the patient that their symptoms, signs, and imaging are most consistent with right patellar tendonitis, possible fat pad impingement, ITB syndrome, and left knee biceps femoris tendinosis. We reviewed the natural history of this condition and treatment options. We agreed on the following plan:   X-ray of bilateral knees taken today. Encouraged to continue home exercises per handout. Continue physical therapy. New referral provided. Imaging: As patient has completed 6 weeks of   home exercise and physical therapy with minimal symptomatic improvement, we will obtain MRI of bilateral knees. Follow up after imaging.
no chest pain, no cough, and no shortness of breath.

## 2025-04-01 ENCOUNTER — EMERGENCY (EMERGENCY)
Facility: HOSPITAL | Age: 70
LOS: 1 days | Discharge: HOME | End: 2025-04-01
Attending: EMERGENCY MEDICINE | Admitting: EMERGENCY MEDICINE
Payer: MEDICARE

## 2025-04-01 ENCOUNTER — TRANSCRIPTION ENCOUNTER (OUTPATIENT)
Age: 70
End: 2025-04-01

## 2025-04-01 VITALS
HEART RATE: 58 BPM | RESPIRATION RATE: 16 BRPM | SYSTOLIC BLOOD PRESSURE: 133 MMHG | DIASTOLIC BLOOD PRESSURE: 96 MMHG | OXYGEN SATURATION: 100 %

## 2025-04-01 VITALS
RESPIRATION RATE: 18 BRPM | SYSTOLIC BLOOD PRESSURE: 160 MMHG | HEART RATE: 65 BPM | OXYGEN SATURATION: 97 % | WEIGHT: 184.09 LBS | HEIGHT: 72 IN | TEMPERATURE: 98 F | DIASTOLIC BLOOD PRESSURE: 101 MMHG

## 2025-04-01 DIAGNOSIS — Z98.890 OTHER SPECIFIED POSTPROCEDURAL STATES: Chronic | ICD-10-CM

## 2025-04-01 DIAGNOSIS — D13.1 BENIGN NEOPLASM OF STOMACH: Chronic | ICD-10-CM

## 2025-04-01 LAB
ALP SERPL-CCNC: 125 U/L — HIGH (ref 40–120)
ANION GAP SERPL CALC-SCNC: 14 MMOL/L — SIGNIFICANT CHANGE UP (ref 7–14)
APPEARANCE UR: CLEAR — SIGNIFICANT CHANGE UP
AST SERPL-CCNC: 23 U/L — SIGNIFICANT CHANGE UP (ref 4–40)
BASOPHILS # BLD AUTO: 0.05 K/UL — SIGNIFICANT CHANGE UP (ref 0–0.2)
BASOPHILS NFR BLD AUTO: 0.9 % — SIGNIFICANT CHANGE UP (ref 0–2)
BILIRUB SERPL-MCNC: 0.7 MG/DL — SIGNIFICANT CHANGE UP (ref 0.2–1.2)
BILIRUB UR-MCNC: NEGATIVE — SIGNIFICANT CHANGE UP
BUN SERPL-MCNC: 13 MG/DL — SIGNIFICANT CHANGE UP (ref 7–23)
CALCIUM SERPL-MCNC: 9.5 MG/DL — SIGNIFICANT CHANGE UP (ref 8.4–10.5)
CHLORIDE SERPL-SCNC: 103 MMOL/L — SIGNIFICANT CHANGE UP (ref 98–107)
CO2 SERPL-SCNC: 21 MMOL/L — LOW (ref 22–31)
COLOR SPEC: YELLOW — SIGNIFICANT CHANGE UP
CREAT SERPL-MCNC: 1.19 MG/DL — SIGNIFICANT CHANGE UP (ref 0.5–1.3)
DIFF PNL FLD: NEGATIVE — SIGNIFICANT CHANGE UP
EGFR: 66 ML/MIN/1.73M2 — SIGNIFICANT CHANGE UP
EGFR: 66 ML/MIN/1.73M2 — SIGNIFICANT CHANGE UP
EOSINOPHIL # BLD AUTO: 0.19 K/UL — SIGNIFICANT CHANGE UP (ref 0–0.5)
EOSINOPHIL NFR BLD AUTO: 3.6 % — SIGNIFICANT CHANGE UP (ref 0–6)
GLUCOSE SERPL-MCNC: 101 MG/DL — HIGH (ref 70–99)
GLUCOSE UR QL: NEGATIVE MG/DL — SIGNIFICANT CHANGE UP
HCT VFR BLD CALC: 37.7 % — LOW (ref 39–50)
HGB BLD-MCNC: 12.4 G/DL — LOW (ref 13–17)
IANC: 3.19 K/UL — SIGNIFICANT CHANGE UP (ref 1.8–7.4)
IMM GRANULOCYTES NFR BLD AUTO: 0.2 % — SIGNIFICANT CHANGE UP (ref 0–0.9)
KETONES UR-MCNC: NEGATIVE MG/DL — SIGNIFICANT CHANGE UP
LEUKOCYTE ESTERASE UR-ACNC: NEGATIVE — SIGNIFICANT CHANGE UP
LYMPHOCYTES # BLD AUTO: 1.26 K/UL — SIGNIFICANT CHANGE UP (ref 1–3.3)
LYMPHOCYTES # BLD AUTO: 23.6 % — SIGNIFICANT CHANGE UP (ref 13–44)
MCHC RBC-ENTMCNC: 28.9 PG — SIGNIFICANT CHANGE UP (ref 27–34)
MCHC RBC-ENTMCNC: 32.9 G/DL — SIGNIFICANT CHANGE UP (ref 32–36)
MCV RBC AUTO: 87.9 FL — SIGNIFICANT CHANGE UP (ref 80–100)
MONOCYTES # BLD AUTO: 0.65 K/UL — SIGNIFICANT CHANGE UP (ref 0–0.9)
MONOCYTES NFR BLD AUTO: 12.1 % — SIGNIFICANT CHANGE UP (ref 2–14)
NEUTROPHILS NFR BLD AUTO: 59.6 % — SIGNIFICANT CHANGE UP (ref 43–77)
NITRITE UR-MCNC: NEGATIVE — SIGNIFICANT CHANGE UP
NRBC # BLD AUTO: 0 K/UL — SIGNIFICANT CHANGE UP (ref 0–0)
NRBC # FLD: 0 K/UL — SIGNIFICANT CHANGE UP (ref 0–0)
NRBC BLD AUTO-RTO: 0 /100 WBCS — SIGNIFICANT CHANGE UP (ref 0–0)
PH UR: 7 — SIGNIFICANT CHANGE UP (ref 5–8)
PLATELET # BLD AUTO: 216 K/UL — SIGNIFICANT CHANGE UP (ref 150–400)
POTASSIUM SERPL-MCNC: 4.7 MMOL/L — SIGNIFICANT CHANGE UP (ref 3.5–5.3)
POTASSIUM SERPL-SCNC: 4.7 MMOL/L — SIGNIFICANT CHANGE UP (ref 3.5–5.3)
PROT SERPL-MCNC: 6.9 G/DL — SIGNIFICANT CHANGE UP (ref 6–8.3)
PROT UR-MCNC: NEGATIVE MG/DL — SIGNIFICANT CHANGE UP
RBC # BLD: 4.29 M/UL — SIGNIFICANT CHANGE UP (ref 4.2–5.8)
RBC # FLD: 14.5 % — SIGNIFICANT CHANGE UP (ref 10.3–14.5)
SODIUM SERPL-SCNC: 138 MMOL/L — SIGNIFICANT CHANGE UP (ref 135–145)
UROBILINOGEN FLD QL: 0.2 MG/DL — SIGNIFICANT CHANGE UP (ref 0.2–1)
WBC # BLD: 5.35 K/UL — SIGNIFICANT CHANGE UP (ref 3.8–10.5)
WBC # FLD AUTO: 5.35 K/UL — SIGNIFICANT CHANGE UP (ref 3.8–10.5)

## 2025-04-01 PROCEDURE — 99285 EMERGENCY DEPT VISIT HI MDM: CPT | Mod: FS

## 2025-04-01 PROCEDURE — 74177 CT ABD & PELVIS W/CONTRAST: CPT | Mod: 26

## 2025-04-01 RX ORDER — SODIUM CHLORIDE 9 G/1000ML
1000 INJECTION, SOLUTION INTRAVENOUS ONCE
Refills: 0 | Status: COMPLETED | OUTPATIENT
Start: 2025-04-01 | End: 2025-04-01

## 2025-04-01 RX ORDER — KETOROLAC TROMETHAMINE 30 MG/ML
15 INJECTION, SOLUTION INTRAMUSCULAR; INTRAVENOUS ONCE
Refills: 0 | Status: DISCONTINUED | OUTPATIENT
Start: 2025-04-01 | End: 2025-04-01

## 2025-04-01 RX ORDER — AMLODIPINE BESYLATE 10 MG/1
5 TABLET ORAL ONCE
Refills: 0 | Status: COMPLETED | OUTPATIENT
Start: 2025-04-01 | End: 2025-04-01

## 2025-04-01 RX ADMIN — KETOROLAC TROMETHAMINE 15 MILLIGRAM(S): 30 INJECTION, SOLUTION INTRAMUSCULAR; INTRAVENOUS at 05:22

## 2025-04-01 RX ADMIN — AMLODIPINE BESYLATE 5 MILLIGRAM(S): 10 TABLET ORAL at 07:58

## 2025-04-01 RX ADMIN — SODIUM CHLORIDE 1000 MILLILITER(S): 9 INJECTION, SOLUTION INTRAVENOUS at 05:22

## 2025-04-01 NOTE — ED PROVIDER NOTE - NSFOLLOWUPINSTRUCTIONS_ED_ALL_ED_FT
You were seen in the emergency department for left lower quadrant pain and groin pain. Your CT scan did not show any concerning findings however, did show  left adrenal nodules which have been seen on a prior scan dated 03/08/2024.  Please follow-up with your primary care provider for further management of this finding.    If you develop severe worsening abdominal pain, inability to pass gas or have a bowel movement, pain, difficulty or inability to urinate, blood in the urine or stool, for any other questions or concerns please return to the emergency department.    Please follow-up with your primary care provider in the next 1-3 days for further management of any symptoms.    For pain you can take Tylenol 500 mg every 6 hours as needed you may also take 600 mg of ibuprofen with food every 6 hours as needed.

## 2025-04-01 NOTE — ED PROVIDER NOTE - CLINICAL SUMMARY MEDICAL DECISION MAKING FREE TEXT BOX
68 Y/O M PMH HTN, Glaucoma, achilles tendon rupture, R inguinal hernia repair 2003, L inguinal hernia repair 2013 p/w LLQ pain worse with standing for the past 2 days. Pt states the pain is currently 5/10 in severity. Pt denies pain with urination. Pt states he has difficulty sleeping due to the pain. Pt denies fever chills or nightsweats and denies nausea vomiting or diarrhea. Plan is to obtain a UA and culture to eval for a UTI, will obtain a CBC and CMP to eval for anemia or electrolyte disturbance and will obtain a CT to eval for diverticulitis, colitis or other acute abdominal pathology.

## 2025-04-01 NOTE — ED PROVIDER NOTE - PRINCIPAL DIAGNOSIS
Abdominal pain [email or web address]

## 2025-04-01 NOTE — ED PROVIDER NOTE - ATTENDING APP SHARED VISIT CONTRIBUTION OF CARE
Attending note:   After face to face evaluation of this patient, I concur with above noted hx, pe, and care plan for this patient. Greer: 69-year-old male with history of bilateral inguinal hernia repair in 2003 in 2013.  Patient presents ED with 2 days of left lower quadrant abdominal pain radiating down to the groin.  Patient denies any urinary symptoms or difficulty defecating.  Patient denies any nausea or vomiting or fevers and chills.  Patient denies any dysuria or hematuria.  On exam patient is well-appearing and vital signs show an elevated blood pressure but otherwise unremarkable.  Patient is in no distress.  Lungs are clear and heart is regular rate and rhythm.  No CVA tenderness or midline spinal tenderness noted.  Abdomen is soft with bowel sounds present.  There is however some mild left lower quadrant tenderness especially down towards the inguinal crease.  However no palpable mass or hernias noted.  Testicular exam is grossly unremarkable.  Uncircumcised penis with no testicular erythema or edema or tenderness.  Chaperoned by GENIE ga.  Will get CT to rule out diverticulitis or hernia and check UA and labs.  Pain medication if required if workup is unremarkable patient can likely follow-up as outpatient.

## 2025-04-01 NOTE — ED PROVIDER NOTE - OBJECTIVE STATEMENT
70 Y/O M PMH HTN, Glaucoma, achilles tendon rupture, R inguinal hernia repair 2003, L inguinal hernia repair 2013 p/w LLQ pain worse with standing for the past 2 days. Pt states the pain is currently 5/10 in severity. Pt denies pain with urination. Pt states he has difficulty sleeping due to the pain. Pt denies fever chills or nightsweats and denies nausea vomiting or diarrhea. Pt denies any other sx or acute complaints.

## 2025-04-01 NOTE — ED ADULT NURSE NOTE - OBJECTIVE STATEMENT
Pt received to room 16. Pt is a 69 year old male with Hx of HTN, hernia. Pt presented to ED c/o groin pain. States groin pain started 2 days ago, describes it as "nagging pain." Denies trauma, fall, lifting heavy. Denies chest pain, SOB, headache, dizziness, abdominal pain, n/v/d, urinary symptoms, fevers/chills, numbness/tingling.   Pt is A&Ox4, ambulatory without assistance. neuro/sensory intact. airway patent, speaking clearly in full sentences. breathing is even and unlabored. abdomen is soft, nontender, nondistended. no edema noted. skin is intact. spontaneous movement of all extremities. VS as noted in flowsheet. 20G IV placed to L AC, +blood return, flushes without difficulty. labs collected and sent. medications administered as ordered. awaiting imaging. comfort measures provided. stretcher set in lowest position, call bell within reach, safety maintained. No acute distress noted.

## 2025-04-01 NOTE — ED PROVIDER NOTE - PHYSICAL EXAMINATION
: GENIE ga chaperoned by MD Greer. No discharge or lesions. No palpable hernia. Testes with normal lye, intact cremasteric reflex.

## 2025-04-01 NOTE — ED ADULT NURSE REASSESSMENT NOTE - NS ED NURSE REASSESS COMMENT FT1
patient alert ox4 came in c/o left groin pain. denies nay pain on urination. denies any pain now. respirations even and unlabored. abdomen soft and nontender. denies any swelling to groin. denies N/V/D. endorses to passing gas. saline lock to left AC with 20G saline lock intact, flushes well. safety maintained. pending results and reassessment.

## 2025-04-01 NOTE — ED PROVIDER NOTE - PATIENT PORTAL LINK FT
You can access the FollowMyHealth Patient Portal offered by [facility] by registering at the following website: [email or web address]. By joining [email or web address]’s FollowMyHealth portal, you will also be able to view your health information using other applications (apps) compatible with our system.

## 2025-04-01 NOTE — ED PROVIDER NOTE - PROGRESS NOTE DETAILS
Cam Hugo MD (PGY2): Received signout from outgoing provider.  69 male PMHx significant HTN, glaucoma, bilateral inguinal repairs, presenting with LLQ pain x 2 days laboratory work at this time non-actionable.  Patient ordered for CT abdomen pelvis to evaluate for acute intra-abdominal pathology including but not limited to diverticulitis, acute herniation.  CT to be performed.  Will reassess.  Further steps in management pending results of CT imaging. Cam Hugo MD (PGY2): CT scan negative for acute findings.  Redemonstrated left adrenal nodules unchanged from prior scan on 3/8/2024.